# Patient Record
Sex: FEMALE | Race: WHITE | NOT HISPANIC OR LATINO | Employment: OTHER | URBAN - METROPOLITAN AREA
[De-identification: names, ages, dates, MRNs, and addresses within clinical notes are randomized per-mention and may not be internally consistent; named-entity substitution may affect disease eponyms.]

---

## 2023-08-25 ENCOUNTER — HOSPITAL ENCOUNTER (OUTPATIENT)
Dept: DATA CONVERSION | Facility: HOSPITAL | Age: 88
Discharge: HOME | End: 2023-08-25
Payer: MEDICARE

## 2023-08-25 DIAGNOSIS — F41.9 ANXIETY DISORDER, UNSPECIFIED: ICD-10-CM

## 2023-08-25 DIAGNOSIS — R00.0 TACHYCARDIA, UNSPECIFIED: ICD-10-CM

## 2023-08-25 DIAGNOSIS — Z79.84 LONG TERM (CURRENT) USE OF ORAL HYPOGLYCEMIC DRUGS: ICD-10-CM

## 2023-08-25 DIAGNOSIS — M79.602 PAIN IN LEFT ARM: ICD-10-CM

## 2023-08-25 DIAGNOSIS — H02.109 UNSPECIFIED ECTROPION OF UNSPECIFIED EYE, UNSPECIFIED EYELID: ICD-10-CM

## 2023-08-25 DIAGNOSIS — I10 ESSENTIAL (PRIMARY) HYPERTENSION: ICD-10-CM

## 2023-08-25 DIAGNOSIS — Z79.899 OTHER LONG TERM (CURRENT) DRUG THERAPY: ICD-10-CM

## 2023-08-25 DIAGNOSIS — Z79.01 LONG TERM (CURRENT) USE OF ANTICOAGULANTS: ICD-10-CM

## 2023-08-25 DIAGNOSIS — M25.412 EFFUSION, LEFT SHOULDER: ICD-10-CM

## 2023-08-25 DIAGNOSIS — R07.9 CHEST PAIN, UNSPECIFIED: ICD-10-CM

## 2023-08-25 DIAGNOSIS — I48.91 UNSPECIFIED ATRIAL FIBRILLATION (MULTI): ICD-10-CM

## 2023-08-25 DIAGNOSIS — R68.84 JAW PAIN: ICD-10-CM

## 2023-08-25 LAB
ANION GAP SERPL CALCULATED.3IONS-SCNC: 11 MMOL/L (ref 0–19)
ANTICOAGULANT: ABNORMAL
ANTICOAGULANT: ABNORMAL
APTT PPP: 38.9 SEC (ref 22–32.5)
BUN SERPL-MCNC: 31 MG/DL (ref 8–25)
BUN/CREAT SERPL: 34.4 RATIO (ref 8–21)
CALCIUM SERPL-MCNC: 9.2 MG/DL (ref 8.5–10.4)
CHLORIDE SERPL-SCNC: 102 MMOL/L (ref 97–107)
CO2 SERPL-SCNC: 28 MMOL/L (ref 24–31)
CREAT SERPL-MCNC: 0.9 MG/DL (ref 0.4–1.6)
DEPRECATED RDW RBC AUTO: 49.1 FL (ref 37–54)
DIFFERENTIAL METHOD BLD: MANUAL DIFF
ERYTHROCYTE [DISTWIDTH] IN BLOOD BY AUTOMATED COUNT: 14.3 % (ref 11.7–15)
GFR SERPL CREATININE-BSD FRML MDRD: 59 ML/MIN/1.73 M2
GLUCOSE SERPL-MCNC: 117 MG/DL (ref 65–99)
HCT VFR BLD AUTO: 36.4 % (ref 36–44)
HGB BLD-MCNC: 11.8 GM/DL (ref 12–15)
HS TROPONIN T DELTA: 1 (ref 0–4)
HS TROPONIN T DELTA: ABNORMAL (ref 0–4)
INR PPP: 2.3 (ref 0.86–1.16)
LYMPHOCYTES # BLD AUTO: 1.65 K/UL (ref 1.2–3.2)
LYMPHOCYTES NFR BLD MANUAL: 11 % (ref 20–40)
MCH RBC QN AUTO: 30.8 PG (ref 26–34)
MCHC RBC AUTO-ENTMCNC: 32.4 % (ref 31–37)
MCV RBC AUTO: 95 FL (ref 80–100)
MONOCYTES # BLD AUTO: 3.9 K/UL (ref 0–0.8)
MONOCYTES NFR BLD MANUAL: 26 % (ref 0–8)
NEUTROPHILS # BLD AUTO: 8.67 K/UL
NEUTROPHILS # BLD AUTO: 9.3 K/UL (ref 1.8–7.7)
NEUTS SEG NFR BLD: 62 % (ref 50–70)
NRBC BLD-RTO: 0 /100 WBC
PATH REV BLD -IMP: NORMAL
PLATELET # BLD AUTO: 269 K/UL (ref 150–450)
PLATELET BLD QL SMEAR: ABNORMAL
PMV BLD AUTO: 9.5 CU (ref 7–12.6)
POTASSIUM SERPL-SCNC: 4.2 MMOL/L (ref 3.4–5.1)
PROTHROMBIN TIME: 23.4 SEC (ref 9.3–12.7)
RBC # BLD AUTO: 3.83 M/UL (ref 4–4.9)
RBC MORPH BLD: ABNORMAL
SODIUM SERPL-SCNC: 140 MMOL/L (ref 133–145)
TROPONIN T SERPL-MCNC: 27 NG/L
TROPONIN T SERPL-MCNC: 28 NG/L
VARIANT LYMPHS # BLD MANUAL: 1 %
VARIANT LYMPHS NFR BLD MANUAL: 0.15 K/UL
WBC # BLD AUTO: 15 K/UL (ref 4.5–11)

## 2023-11-29 ENCOUNTER — APPOINTMENT (OUTPATIENT)
Dept: RADIOLOGY | Facility: HOSPITAL | Age: 88
End: 2023-11-29
Payer: MEDICARE

## 2023-11-29 ENCOUNTER — HOSPITAL ENCOUNTER (OUTPATIENT)
Facility: HOSPITAL | Age: 88
Setting detail: OBSERVATION
Discharge: HOME | End: 2023-11-30
Attending: STUDENT IN AN ORGANIZED HEALTH CARE EDUCATION/TRAINING PROGRAM | Admitting: INTERNAL MEDICINE
Payer: MEDICARE

## 2023-11-29 DIAGNOSIS — R53.1 WEAKNESS: ICD-10-CM

## 2023-11-29 DIAGNOSIS — L03.115 CELLULITIS OF RIGHT LOWER EXTREMITY: ICD-10-CM

## 2023-11-29 DIAGNOSIS — R11.2 NAUSEA AND VOMITING, UNSPECIFIED VOMITING TYPE: Primary | ICD-10-CM

## 2023-11-29 LAB
ANION GAP SERPL CALC-SCNC: 10 MMOL/L
APTT PPP: 42.3 SECONDS (ref 22–32.5)
BASOPHILS # BLD AUTO: 0.03 X10*3/UL (ref 0–0.1)
BASOPHILS NFR BLD AUTO: 0.3 %
BUN SERPL-MCNC: 36 MG/DL (ref 8–25)
CALCIUM SERPL-MCNC: 9.4 MG/DL (ref 8.5–10.4)
CHLORIDE SERPL-SCNC: 105 MMOL/L (ref 97–107)
CO2 SERPL-SCNC: 27 MMOL/L (ref 24–31)
CREAT SERPL-MCNC: 1 MG/DL (ref 0.4–1.6)
EOSINOPHIL # BLD AUTO: 0.01 X10*3/UL (ref 0–0.4)
EOSINOPHIL NFR BLD AUTO: 0.1 %
ERYTHROCYTE [DISTWIDTH] IN BLOOD BY AUTOMATED COUNT: 13.8 % (ref 11.5–14.5)
GFR SERPL CREATININE-BSD FRML MDRD: 52 ML/MIN/1.73M*2
GLUCOSE SERPL-MCNC: 133 MG/DL (ref 65–99)
HCT VFR BLD AUTO: 37.1 % (ref 36–46)
HGB BLD-MCNC: 11.6 G/DL (ref 12–16)
IMM GRANULOCYTES # BLD AUTO: 0.08 X10*3/UL (ref 0–0.5)
IMM GRANULOCYTES NFR BLD AUTO: 0.9 % (ref 0–0.9)
INR PPP: 3.8 (ref 0.9–1.2)
LIPASE SERPL-CCNC: 66 U/L (ref 16–63)
LYMPHOCYTES # BLD AUTO: 1.61 X10*3/UL (ref 0.8–3)
LYMPHOCYTES NFR BLD AUTO: 17.2 %
MCH RBC QN AUTO: 31 PG (ref 26–34)
MCHC RBC AUTO-ENTMCNC: 31.3 G/DL (ref 32–36)
MCV RBC AUTO: 99 FL (ref 80–100)
MONOCYTES # BLD AUTO: 0.89 X10*3/UL (ref 0.05–0.8)
MONOCYTES NFR BLD AUTO: 9.5 %
NEUTROPHILS # BLD AUTO: 6.73 X10*3/UL (ref 1.6–5.5)
NEUTROPHILS NFR BLD AUTO: 72 %
NRBC BLD-RTO: 0 /100 WBCS (ref 0–0)
PLATELET # BLD AUTO: 287 X10*3/UL (ref 150–450)
POTASSIUM SERPL-SCNC: 4.2 MMOL/L (ref 3.4–5.1)
PROTHROMBIN TIME: 37 SECONDS (ref 9.3–12.7)
RBC # BLD AUTO: 3.74 X10*6/UL (ref 4–5.2)
SODIUM SERPL-SCNC: 142 MMOL/L (ref 133–145)
WBC # BLD AUTO: 9.4 X10*3/UL (ref 4.4–11.3)

## 2023-11-29 PROCEDURE — 2500000004 HC RX 250 GENERAL PHARMACY W/ HCPCS (ALT 636 FOR OP/ED): Performed by: INTERNAL MEDICINE

## 2023-11-29 PROCEDURE — 96376 TX/PRO/DX INJ SAME DRUG ADON: CPT | Performed by: NURSE ANESTHETIST, CERTIFIED REGISTERED

## 2023-11-29 PROCEDURE — 85025 COMPLETE CBC W/AUTO DIFF WBC: CPT | Performed by: STUDENT IN AN ORGANIZED HEALTH CARE EDUCATION/TRAINING PROGRAM

## 2023-11-29 PROCEDURE — 74177 CT ABD & PELVIS W/CONTRAST: CPT

## 2023-11-29 PROCEDURE — 2550000001 HC RX 255 CONTRASTS: Performed by: STUDENT IN AN ORGANIZED HEALTH CARE EDUCATION/TRAINING PROGRAM

## 2023-11-29 PROCEDURE — 2500000001 HC RX 250 WO HCPCS SELF ADMINISTERED DRUGS (ALT 637 FOR MEDICARE OP): Performed by: INTERNAL MEDICINE

## 2023-11-29 PROCEDURE — 36415 COLL VENOUS BLD VENIPUNCTURE: CPT | Performed by: STUDENT IN AN ORGANIZED HEALTH CARE EDUCATION/TRAINING PROGRAM

## 2023-11-29 PROCEDURE — 85610 PROTHROMBIN TIME: CPT | Performed by: STUDENT IN AN ORGANIZED HEALTH CARE EDUCATION/TRAINING PROGRAM

## 2023-11-29 PROCEDURE — 80048 BASIC METABOLIC PNL TOTAL CA: CPT | Performed by: STUDENT IN AN ORGANIZED HEALTH CARE EDUCATION/TRAINING PROGRAM

## 2023-11-29 PROCEDURE — C9113 INJ PANTOPRAZOLE SODIUM, VIA: HCPCS | Performed by: INTERNAL MEDICINE

## 2023-11-29 PROCEDURE — G0378 HOSPITAL OBSERVATION PER HR: HCPCS

## 2023-11-29 PROCEDURE — 2500000001 HC RX 250 WO HCPCS SELF ADMINISTERED DRUGS (ALT 637 FOR MEDICARE OP): Performed by: STUDENT IN AN ORGANIZED HEALTH CARE EDUCATION/TRAINING PROGRAM

## 2023-11-29 PROCEDURE — 2500000004 HC RX 250 GENERAL PHARMACY W/ HCPCS (ALT 636 FOR OP/ED): Performed by: STUDENT IN AN ORGANIZED HEALTH CARE EDUCATION/TRAINING PROGRAM

## 2023-11-29 PROCEDURE — 83690 ASSAY OF LIPASE: CPT | Performed by: STUDENT IN AN ORGANIZED HEALTH CARE EDUCATION/TRAINING PROGRAM

## 2023-11-29 PROCEDURE — 71045 X-RAY EXAM CHEST 1 VIEW: CPT

## 2023-11-29 PROCEDURE — 96365 THER/PROPH/DIAG IV INF INIT: CPT | Performed by: NURSE ANESTHETIST, CERTIFIED REGISTERED

## 2023-11-29 PROCEDURE — C9113 INJ PANTOPRAZOLE SODIUM, VIA: HCPCS | Performed by: STUDENT IN AN ORGANIZED HEALTH CARE EDUCATION/TRAINING PROGRAM

## 2023-11-29 PROCEDURE — 85730 THROMBOPLASTIN TIME PARTIAL: CPT | Performed by: STUDENT IN AN ORGANIZED HEALTH CARE EDUCATION/TRAINING PROGRAM

## 2023-11-29 PROCEDURE — 99285 EMERGENCY DEPT VISIT HI MDM: CPT | Performed by: STUDENT IN AN ORGANIZED HEALTH CARE EDUCATION/TRAINING PROGRAM

## 2023-11-29 PROCEDURE — 96375 TX/PRO/DX INJ NEW DRUG ADDON: CPT | Performed by: NURSE ANESTHETIST, CERTIFIED REGISTERED

## 2023-11-29 RX ORDER — LIDOCAINE HYDROCHLORIDE 20 MG/ML
15 SOLUTION OROPHARYNGEAL ONCE
Status: COMPLETED | OUTPATIENT
Start: 2023-11-29 | End: 2023-11-29

## 2023-11-29 RX ORDER — ACETAMINOPHEN 325 MG/1
650 TABLET ORAL EVERY 4 HOURS PRN
Status: DISCONTINUED | OUTPATIENT
Start: 2023-11-29 | End: 2023-11-30 | Stop reason: HOSPADM

## 2023-11-29 RX ORDER — ACETAMINOPHEN 650 MG/1
650 SUPPOSITORY RECTAL EVERY 4 HOURS PRN
Status: DISCONTINUED | OUTPATIENT
Start: 2023-11-29 | End: 2023-11-30 | Stop reason: HOSPADM

## 2023-11-29 RX ORDER — POLYETHYLENE GLYCOL 3350 17 G/17G
17 POWDER, FOR SOLUTION ORAL DAILY PRN
Status: DISCONTINUED | OUTPATIENT
Start: 2023-11-29 | End: 2023-11-30 | Stop reason: HOSPADM

## 2023-11-29 RX ORDER — SODIUM CHLORIDE 9 MG/ML
125 INJECTION, SOLUTION INTRAVENOUS CONTINUOUS
Status: DISCONTINUED | OUTPATIENT
Start: 2023-11-29 | End: 2023-11-30 | Stop reason: HOSPADM

## 2023-11-29 RX ORDER — METOCLOPRAMIDE HYDROCHLORIDE 5 MG/ML
10 INJECTION INTRAMUSCULAR; INTRAVENOUS ONCE
Status: COMPLETED | OUTPATIENT
Start: 2023-11-29 | End: 2023-11-29

## 2023-11-29 RX ORDER — GABAPENTIN 100 MG/1
100 CAPSULE ORAL 3 TIMES DAILY
Status: ON HOLD | COMMUNITY
Start: 2023-07-25 | End: 2023-12-01 | Stop reason: SDUPTHER

## 2023-11-29 RX ORDER — ALUMINUM HYDROXIDE, MAGNESIUM HYDROXIDE, AND SIMETHICONE 1200; 120; 1200 MG/30ML; MG/30ML; MG/30ML
30 SUSPENSION ORAL ONCE
Status: COMPLETED | OUTPATIENT
Start: 2023-11-29 | End: 2023-11-29

## 2023-11-29 RX ORDER — MONTELUKAST SODIUM 4 MG/1
2 TABLET, CHEWABLE ORAL 2 TIMES DAILY
Status: DISCONTINUED | OUTPATIENT
Start: 2023-11-29 | End: 2023-11-30 | Stop reason: HOSPADM

## 2023-11-29 RX ORDER — VANCOMYCIN 1 G/200ML
1000 INJECTION, SOLUTION INTRAVENOUS EVERY 24 HOURS
Status: DISCONTINUED | OUTPATIENT
Start: 2023-11-29 | End: 2023-11-30

## 2023-11-29 RX ORDER — CHLORTHALIDONE 25 MG/1
25 TABLET ORAL
COMMUNITY
Start: 2023-04-11 | End: 2023-12-01

## 2023-11-29 RX ORDER — ONDANSETRON HYDROCHLORIDE 2 MG/ML
4 INJECTION, SOLUTION INTRAVENOUS EVERY 8 HOURS PRN
Status: DISCONTINUED | OUTPATIENT
Start: 2023-11-29 | End: 2023-11-30 | Stop reason: HOSPADM

## 2023-11-29 RX ORDER — WARFARIN 10 MG/1
10 TABLET ORAL SEE ADMIN INSTRUCTIONS
COMMUNITY
End: 2023-11-30 | Stop reason: HOSPADM

## 2023-11-29 RX ORDER — WARFARIN SODIUM 5 MG/1
5 TABLET ORAL SEE ADMIN INSTRUCTIONS
COMMUNITY
Start: 2020-02-04 | End: 2023-11-30 | Stop reason: HOSPADM

## 2023-11-29 RX ORDER — METFORMIN HYDROCHLORIDE 500 MG/1
500 TABLET ORAL
COMMUNITY
Start: 2023-08-07 | End: 2023-12-01

## 2023-11-29 RX ORDER — AMLODIPINE BESYLATE 2.5 MG/1
2.5 TABLET ORAL DAILY
Status: DISCONTINUED | OUTPATIENT
Start: 2023-11-30 | End: 2023-11-30 | Stop reason: HOSPADM

## 2023-11-29 RX ORDER — GABAPENTIN 100 MG/1
100 CAPSULE ORAL 3 TIMES DAILY
Status: DISCONTINUED | OUTPATIENT
Start: 2023-11-29 | End: 2023-11-30 | Stop reason: HOSPADM

## 2023-11-29 RX ORDER — CEFTRIAXONE 1 G/50ML
1 INJECTION, SOLUTION INTRAVENOUS EVERY 24 HOURS
Status: DISCONTINUED | OUTPATIENT
Start: 2023-11-29 | End: 2023-11-30

## 2023-11-29 RX ORDER — LATANOPROST 50 UG/ML
1 SOLUTION/ DROPS OPHTHALMIC NIGHTLY
Status: DISCONTINUED | OUTPATIENT
Start: 2023-11-29 | End: 2023-11-30 | Stop reason: HOSPADM

## 2023-11-29 RX ORDER — PANTOPRAZOLE SODIUM 40 MG/10ML
40 INJECTION, POWDER, LYOPHILIZED, FOR SOLUTION INTRAVENOUS ONCE
Status: COMPLETED | OUTPATIENT
Start: 2023-11-29 | End: 2023-11-29

## 2023-11-29 RX ORDER — HYDRALAZINE HYDROCHLORIDE 20 MG/ML
10 INJECTION INTRAMUSCULAR; INTRAVENOUS EVERY 4 HOURS PRN
Status: DISCONTINUED | OUTPATIENT
Start: 2023-11-29 | End: 2023-11-30 | Stop reason: HOSPADM

## 2023-11-29 RX ORDER — METOPROLOL SUCCINATE 25 MG/1
100 TABLET, EXTENDED RELEASE ORAL DAILY
COMMUNITY
Start: 2020-04-27 | End: 2023-12-19 | Stop reason: HOSPADM

## 2023-11-29 RX ORDER — TALC
3 POWDER (GRAM) TOPICAL DAILY
Status: DISCONTINUED | OUTPATIENT
Start: 2023-11-29 | End: 2023-11-30 | Stop reason: HOSPADM

## 2023-11-29 RX ORDER — POTASSIUM CHLORIDE 750 MG/1
10 TABLET, EXTENDED RELEASE ORAL DAILY
COMMUNITY
End: 2023-12-01

## 2023-11-29 RX ORDER — AMLODIPINE BESYLATE 2.5 MG/1
2.5 TABLET ORAL DAILY
COMMUNITY
End: 2023-12-19 | Stop reason: HOSPADM

## 2023-11-29 RX ORDER — ACETAMINOPHEN 160 MG/5ML
650 SOLUTION ORAL EVERY 4 HOURS PRN
Status: DISCONTINUED | OUTPATIENT
Start: 2023-11-29 | End: 2023-11-30 | Stop reason: HOSPADM

## 2023-11-29 RX ORDER — METOPROLOL SUCCINATE 25 MG/1
25 TABLET, EXTENDED RELEASE ORAL DAILY
Status: DISCONTINUED | OUTPATIENT
Start: 2023-11-30 | End: 2023-11-30 | Stop reason: HOSPADM

## 2023-11-29 RX ORDER — MONTELUKAST SODIUM 4 MG/1
2 TABLET, CHEWABLE ORAL 2 TIMES DAILY
COMMUNITY

## 2023-11-29 RX ORDER — PANTOPRAZOLE SODIUM 40 MG/10ML
40 INJECTION, POWDER, LYOPHILIZED, FOR SOLUTION INTRAVENOUS 2 TIMES DAILY
Status: DISCONTINUED | OUTPATIENT
Start: 2023-11-29 | End: 2023-11-30 | Stop reason: HOSPADM

## 2023-11-29 RX ORDER — PREDNISONE 5 MG/1
5 TABLET ORAL DAILY
Status: DISCONTINUED | OUTPATIENT
Start: 2023-11-30 | End: 2023-11-30 | Stop reason: HOSPADM

## 2023-11-29 RX ORDER — LEVOTHYROXINE SODIUM 100 UG/1
100 TABLET ORAL
COMMUNITY
Start: 2023-08-07

## 2023-11-29 RX ORDER — ONDANSETRON 4 MG/1
4 TABLET, ORALLY DISINTEGRATING ORAL EVERY 8 HOURS PRN
Status: DISCONTINUED | OUTPATIENT
Start: 2023-11-29 | End: 2023-11-30 | Stop reason: HOSPADM

## 2023-11-29 RX ORDER — ACETAMINOPHEN 325 MG/1
1000 TABLET ORAL 2 TIMES DAILY PRN
COMMUNITY

## 2023-11-29 RX ORDER — CHLORTHALIDONE 25 MG/1
25 TABLET ORAL
Status: DISCONTINUED | OUTPATIENT
Start: 2023-11-29 | End: 2023-11-30 | Stop reason: HOSPADM

## 2023-11-29 RX ORDER — LEVOTHYROXINE SODIUM 100 UG/1
100 TABLET ORAL
Status: DISCONTINUED | OUTPATIENT
Start: 2023-11-30 | End: 2023-11-30 | Stop reason: HOSPADM

## 2023-11-29 RX ORDER — PREDNISONE 5 MG/1
5 TABLET ORAL DAILY
COMMUNITY
End: 2023-12-19 | Stop reason: HOSPADM

## 2023-11-29 RX ORDER — POTASSIUM CHLORIDE 750 MG/1
10 TABLET, FILM COATED, EXTENDED RELEASE ORAL DAILY
Status: DISCONTINUED | OUTPATIENT
Start: 2023-11-30 | End: 2023-11-30 | Stop reason: HOSPADM

## 2023-11-29 RX ADMIN — ALUMINUM HYDROXIDE, MAGNESIUM HYDROXIDE, AND SIMETHICONE 30 ML: 200; 200; 20 SUSPENSION ORAL at 15:52

## 2023-11-29 RX ADMIN — METOCLOPRAMIDE 10 MG: 5 INJECTION, SOLUTION INTRAMUSCULAR; INTRAVENOUS at 15:50

## 2023-11-29 RX ADMIN — IOHEXOL 75 ML: 350 INJECTION, SOLUTION INTRAVENOUS at 17:05

## 2023-11-29 RX ADMIN — PANTOPRAZOLE SODIUM 40 MG: 40 INJECTION, POWDER, FOR SOLUTION INTRAVENOUS at 23:07

## 2023-11-29 RX ADMIN — SODIUM CHLORIDE 125 ML/HR: 900 INJECTION, SOLUTION INTRAVENOUS at 18:47

## 2023-11-29 RX ADMIN — LATANOPROST 1 DROP: 50 SOLUTION OPHTHALMIC at 22:48

## 2023-11-29 RX ADMIN — CEFTRIAXONE SODIUM 1 G: 1 INJECTION, SOLUTION INTRAVENOUS at 23:07

## 2023-11-29 RX ADMIN — PANTOPRAZOLE SODIUM 40 MG: 40 INJECTION, POWDER, FOR SOLUTION INTRAVENOUS at 15:50

## 2023-11-29 RX ADMIN — LIDOCAINE HYDROCHLORIDE 15 ML: 20 SOLUTION ORAL at 15:52

## 2023-11-29 SDOH — SOCIAL STABILITY: SOCIAL INSECURITY: DO YOU FEEL ANYONE HAS EXPLOITED OR TAKEN ADVANTAGE OF YOU FINANCIALLY OR OF YOUR PERSONAL PROPERTY?: NO

## 2023-11-29 SDOH — SOCIAL STABILITY: SOCIAL INSECURITY: DOES ANYONE TRY TO KEEP YOU FROM HAVING/CONTACTING OTHER FRIENDS OR DOING THINGS OUTSIDE YOUR HOME?: NO

## 2023-11-29 SDOH — HEALTH STABILITY: MENTAL HEALTH: EXPERIENCED ANY OF THE FOLLOWING LIFE EVENTS: OTHER (COMMENT)

## 2023-11-29 SDOH — SOCIAL STABILITY: SOCIAL INSECURITY: ARE THERE ANY APPARENT SIGNS OF INJURIES/BEHAVIORS THAT COULD BE RELATED TO ABUSE/NEGLECT?: NO

## 2023-11-29 SDOH — SOCIAL STABILITY: SOCIAL INSECURITY: WERE YOU ABLE TO COMPLETE ALL THE BEHAVIORAL HEALTH SCREENINGS?: YES

## 2023-11-29 SDOH — SOCIAL STABILITY: SOCIAL INSECURITY: DO YOU FEEL UNSAFE GOING BACK TO THE PLACE WHERE YOU ARE LIVING?: NO

## 2023-11-29 SDOH — SOCIAL STABILITY: SOCIAL INSECURITY: ARE YOU OR HAVE YOU BEEN THREATENED OR ABUSED PHYSICALLY, EMOTIONALLY, OR SEXUALLY BY ANYONE?: NO

## 2023-11-29 SDOH — SOCIAL STABILITY: SOCIAL INSECURITY: HAS ANYONE EVER THREATENED TO HURT YOUR FAMILY OR YOUR PETS?: NO

## 2023-11-29 SDOH — SOCIAL STABILITY: SOCIAL INSECURITY: HAVE YOU HAD THOUGHTS OF HARMING ANYONE ELSE?: NO

## 2023-11-29 ASSESSMENT — PATIENT HEALTH QUESTIONNAIRE - PHQ9
1. LITTLE INTEREST OR PLEASURE IN DOING THINGS: NOT AT ALL
2. FEELING DOWN, DEPRESSED OR HOPELESS: NOT AT ALL
SUM OF ALL RESPONSES TO PHQ9 QUESTIONS 1 & 2: 0

## 2023-11-29 ASSESSMENT — PAIN DESCRIPTION - LOCATION
LOCATION: BACK
LOCATION: THROAT

## 2023-11-29 ASSESSMENT — LIFESTYLE VARIABLES
HOW MANY STANDARD DRINKS CONTAINING ALCOHOL DO YOU HAVE ON A TYPICAL DAY: PATIENT DOES NOT DRINK
HOW OFTEN DO YOU HAVE A DRINK CONTAINING ALCOHOL: NEVER
PRESCIPTION_ABUSE_PAST_12_MONTHS: NO
SKIP TO QUESTIONS 9-10: 1
HAVE YOU EVER FELT YOU SHOULD CUT DOWN ON YOUR DRINKING: NO
AUDIT-C TOTAL SCORE: 0
SUBSTANCE_ABUSE_PAST_12_MONTHS: NO
REASON UNABLE TO ASSESS: NO
HOW OFTEN DO YOU HAVE 6 OR MORE DRINKS ON ONE OCCASION: NEVER
EVER FELT BAD OR GUILTY ABOUT YOUR DRINKING: NO
AUDIT-C TOTAL SCORE: 0
HAVE PEOPLE ANNOYED YOU BY CRITICIZING YOUR DRINKING: NO
EVER HAD A DRINK FIRST THING IN THE MORNING TO STEADY YOUR NERVES TO GET RID OF A HANGOVER: NO

## 2023-11-29 ASSESSMENT — PAIN SCALES - GENERAL
PAINLEVEL_OUTOF10: 2
PAINLEVEL_OUTOF10: 4

## 2023-11-29 ASSESSMENT — COLUMBIA-SUICIDE SEVERITY RATING SCALE - C-SSRS
1. IN THE PAST MONTH, HAVE YOU WISHED YOU WERE DEAD OR WISHED YOU COULD GO TO SLEEP AND NOT WAKE UP?: NO
2. HAVE YOU ACTUALLY HAD ANY THOUGHTS OF KILLING YOURSELF?: NO
6. HAVE YOU EVER DONE ANYTHING, STARTED TO DO ANYTHING, OR PREPARED TO DO ANYTHING TO END YOUR LIFE?: NO

## 2023-11-29 ASSESSMENT — PAIN - FUNCTIONAL ASSESSMENT: PAIN_FUNCTIONAL_ASSESSMENT: 0-10

## 2023-11-29 ASSESSMENT — PAIN DESCRIPTION - PAIN TYPE: TYPE: ACUTE PAIN

## 2023-11-29 NOTE — ED PROVIDER NOTES
HPI   Chief Complaint   Patient presents with   • Aspiration     Pt attempted to swallow a large pill and states it got stuck in her throat one hour ago. Pt is unable to intake any fluids currently but believes she finally got the pill down.       HPI     Pt is a 89 yo F w/ Hx HLD, HTN, afib on warfarin,   Presenting for evalation of pill impaction   Pt states attempted to swallow her cholesterol medication when it got stuck in her throat   Has felt it migrate down but has been unable to tolerate anything by mouth since                  [place] Coma Scale Score: 15                  Patient History   No past medical history on file.  No past surgical history on file.  No family history on file.  Social History     Tobacco Use   • Smoking status: Not on file   • Smokeless tobacco: Not on file   Substance Use Topics   • Alcohol use: Not on file   • Drug use: Not on file       Physical Exam   ED Triage Vitals [11/29/23 1338]   Temp Heart Rate Resp BP   36.4 °C (97.5 °F) 92 18 (!) 192/95      SpO2 Temp Source Heart Rate Source Patient Position   96 % Oral Monitor Lying      BP Location FiO2 (%)     Left arm --       Physical Exam  Vitals and nursing note reviewed.   Constitutional:       General: She is not in acute distress.     Appearance: She is well-developed.      Comments: Difficulty tolerating her secretions, though no resp distress    HENT:      Head: Normocephalic and atraumatic.      Nose: Nose normal.   Eyes:      Conjunctiva/sclera: Conjunctivae normal.   Cardiovascular:      Rate and Rhythm: Normal rate and regular rhythm.   Pulmonary:      Effort: Pulmonary effort is normal. No respiratory distress.      Breath sounds: Normal breath sounds. No stridor.      Comments: Speaking in clear sentences, no labored resp  Abdominal:      General: Abdomen is flat. There is no distension.   Musculoskeletal:         General: No swelling or signs of injury.   Skin:     General: Skin is warm and dry.   Neurological:       General: No focal deficit present.      Mental Status: She is alert and oriented to person, place, and time. Mental status is at baseline.   Psychiatric:         Mood and Affect: Mood normal.         ED Course & MDM   ED Course as of 11/29/23 1849 Wed Nov 29, 2023   1426 ECG 12 lead  NSR, rate 87, RBBB, no stemi  []   1541 Pt seen by GI, Dr. Dunn was able to tolerate oral liquids, rec GI cocktail, protonix for 10 days and outpt FU  []   1846 Pt unable to tolerate PO after initially tolerating small amt liquid. Attempted GI cocktail along w/ protonix and anti emetic. Pt does state she feels improved however still unable to tolerate anything PO. She is able to tolerate her secretions now. Did undergo CT for eval infx / acute intra abd process. Has no pneumobilia likely related to prior monica/ERCP. Abd is soft on re eval, no focal ttp. Will admit for period of observation for continued hydration and supportive care. Pt endorsed to hospitalist for admit. Dr. Voss  []      ED Course User Index  [] Carlyle Aguirre MD         Diagnoses as of 11/29/23 1849   Nausea and vomiting, unspecified vomiting type       Medical Decision Making  Pt presents w/ suspected pill esophagitis / impaction   VS notable for HTN, pt unable to tolerate secretions   Attempted carbonated beverage at bedside, unable to tolerate   Will obtain basic labs/ XR   Contact GI for possible endoscopy     Procedure  Procedures     Carlyle Aguirre MD  11/29/23 1850

## 2023-11-30 ENCOUNTER — PHARMACY VISIT (OUTPATIENT)
Dept: PHARMACY | Facility: CLINIC | Age: 88
End: 2023-11-30
Payer: COMMERCIAL

## 2023-11-30 VITALS
HEIGHT: 62 IN | TEMPERATURE: 98.1 F | BODY MASS INDEX: 29.21 KG/M2 | OXYGEN SATURATION: 96 % | SYSTOLIC BLOOD PRESSURE: 145 MMHG | HEART RATE: 82 BPM | WEIGHT: 158.73 LBS | DIASTOLIC BLOOD PRESSURE: 67 MMHG | RESPIRATION RATE: 18 BRPM

## 2023-11-30 PROBLEM — R11.2 NAUSEA & VOMITING: Status: RESOLVED | Noted: 2023-11-29 | Resolved: 2023-11-30

## 2023-11-30 LAB
ANION GAP SERPL CALC-SCNC: 12 MMOL/L
BUN SERPL-MCNC: 26 MG/DL (ref 8–25)
CALCIUM SERPL-MCNC: 8.3 MG/DL (ref 8.5–10.4)
CHLORIDE SERPL-SCNC: 106 MMOL/L (ref 97–107)
CO2 SERPL-SCNC: 24 MMOL/L (ref 24–31)
CREAT SERPL-MCNC: 0.8 MG/DL (ref 0.4–1.6)
ERYTHROCYTE [DISTWIDTH] IN BLOOD BY AUTOMATED COUNT: 13.6 % (ref 11.5–14.5)
EST. AVERAGE GLUCOSE BLD GHB EST-MCNC: 143 MG/DL
GFR SERPL CREATININE-BSD FRML MDRD: 68 ML/MIN/1.73M*2
GLUCOSE SERPL-MCNC: 144 MG/DL (ref 65–99)
HBA1C MFR BLD: 6.6 %
HCT VFR BLD AUTO: 31.5 % (ref 36–46)
HGB BLD-MCNC: 10.7 G/DL (ref 12–16)
INR PPP: 3.9 (ref 0.9–1.2)
MCH RBC QN AUTO: 31.4 PG (ref 26–34)
MCHC RBC AUTO-ENTMCNC: 34 G/DL (ref 32–36)
MCV RBC AUTO: 92 FL (ref 80–100)
NRBC BLD-RTO: 0 /100 WBCS (ref 0–0)
PLATELET # BLD AUTO: 261 X10*3/UL (ref 150–450)
POTASSIUM SERPL-SCNC: 4 MMOL/L (ref 3.4–5.1)
PROTHROMBIN TIME: 38.2 SECONDS (ref 9.3–12.7)
RBC # BLD AUTO: 3.41 X10*6/UL (ref 4–5.2)
SODIUM SERPL-SCNC: 142 MMOL/L (ref 133–145)
VANCOMYCIN SERPL-MCNC: 13.5 UG/ML (ref 10–20)
WBC # BLD AUTO: 19.2 X10*3/UL (ref 4.4–11.3)

## 2023-11-30 PROCEDURE — 36415 COLL VENOUS BLD VENIPUNCTURE: CPT | Performed by: INTERNAL MEDICINE

## 2023-11-30 PROCEDURE — 87181 SC STD AGAR DILUTION PER AGT: CPT | Mod: WESLAB | Performed by: NURSE PRACTITIONER

## 2023-11-30 PROCEDURE — 85610 PROTHROMBIN TIME: CPT | Performed by: INTERNAL MEDICINE

## 2023-11-30 PROCEDURE — 97161 PT EVAL LOW COMPLEX 20 MIN: CPT | Mod: GP

## 2023-11-30 PROCEDURE — RXMED WILLOW AMBULATORY MEDICATION CHARGE

## 2023-11-30 PROCEDURE — 80048 BASIC METABOLIC PNL TOTAL CA: CPT | Performed by: INTERNAL MEDICINE

## 2023-11-30 PROCEDURE — 87186 SC STD MICRODIL/AGAR DIL: CPT | Mod: 59,WESLAB | Performed by: NURSE PRACTITIONER

## 2023-11-30 PROCEDURE — 97166 OT EVAL MOD COMPLEX 45 MIN: CPT | Mod: GO

## 2023-11-30 PROCEDURE — 80202 ASSAY OF VANCOMYCIN: CPT | Performed by: INTERNAL MEDICINE

## 2023-11-30 PROCEDURE — C9113 INJ PANTOPRAZOLE SODIUM, VIA: HCPCS | Performed by: INTERNAL MEDICINE

## 2023-11-30 PROCEDURE — 96367 TX/PROPH/DG ADDL SEQ IV INF: CPT | Performed by: NURSE ANESTHETIST, CERTIFIED REGISTERED

## 2023-11-30 PROCEDURE — 83036 HEMOGLOBIN GLYCOSYLATED A1C: CPT | Performed by: INTERNAL MEDICINE

## 2023-11-30 PROCEDURE — 2500000004 HC RX 250 GENERAL PHARMACY W/ HCPCS (ALT 636 FOR OP/ED): Mod: MUE | Performed by: INTERNAL MEDICINE

## 2023-11-30 PROCEDURE — G0378 HOSPITAL OBSERVATION PER HR: HCPCS

## 2023-11-30 PROCEDURE — 97530 THERAPEUTIC ACTIVITIES: CPT | Mod: GO

## 2023-11-30 PROCEDURE — 2500000004 HC RX 250 GENERAL PHARMACY W/ HCPCS (ALT 636 FOR OP/ED): Performed by: INTERNAL MEDICINE

## 2023-11-30 PROCEDURE — 2500000001 HC RX 250 WO HCPCS SELF ADMINISTERED DRUGS (ALT 637 FOR MEDICARE OP): Performed by: NURSE PRACTITIONER

## 2023-11-30 PROCEDURE — 85027 COMPLETE CBC AUTOMATED: CPT | Performed by: INTERNAL MEDICINE

## 2023-11-30 PROCEDURE — 2500000001 HC RX 250 WO HCPCS SELF ADMINISTERED DRUGS (ALT 637 FOR MEDICARE OP): Performed by: INTERNAL MEDICINE

## 2023-11-30 PROCEDURE — 2500000004 HC RX 250 GENERAL PHARMACY W/ HCPCS (ALT 636 FOR OP/ED): Performed by: STUDENT IN AN ORGANIZED HEALTH CARE EDUCATION/TRAINING PROGRAM

## 2023-11-30 PROCEDURE — 96376 TX/PRO/DX INJ SAME DRUG ADON: CPT | Performed by: NURSE ANESTHETIST, CERTIFIED REGISTERED

## 2023-11-30 RX ORDER — AMOXICILLIN AND CLAVULANATE POTASSIUM 500; 125 MG/1; MG/1
500 TABLET, FILM COATED ORAL EVERY 12 HOURS SCHEDULED
Status: DISCONTINUED | OUTPATIENT
Start: 2023-11-30 | End: 2023-11-30 | Stop reason: HOSPADM

## 2023-11-30 RX ORDER — DOXYCYCLINE 100 MG/1
100 CAPSULE ORAL EVERY 12 HOURS SCHEDULED
Qty: 28 CAPSULE | Refills: 0 | Status: SHIPPED | OUTPATIENT
Start: 2023-11-30 | End: 2023-11-30 | Stop reason: SDUPTHER

## 2023-11-30 RX ORDER — AMOXICILLIN AND CLAVULANATE POTASSIUM 500; 125 MG/1; MG/1
500 TABLET, FILM COATED ORAL EVERY 12 HOURS SCHEDULED
Qty: 28 TABLET | Refills: 0 | Status: SHIPPED | OUTPATIENT
Start: 2023-12-01 | End: 2023-11-30 | Stop reason: SDUPTHER

## 2023-11-30 RX ORDER — DOXYCYCLINE 100 MG/1
100 CAPSULE ORAL EVERY 12 HOURS SCHEDULED
Qty: 28 CAPSULE | Refills: 0 | Status: SHIPPED | OUTPATIENT
Start: 2023-11-30 | End: 2023-12-19 | Stop reason: HOSPADM

## 2023-11-30 RX ORDER — AMOXICILLIN AND CLAVULANATE POTASSIUM 500; 125 MG/1; MG/1
500 TABLET, FILM COATED ORAL EVERY 12 HOURS SCHEDULED
Qty: 28 TABLET | Refills: 0
Start: 2023-12-01 | End: 2023-11-30 | Stop reason: SDUPTHER

## 2023-11-30 RX ORDER — DOXYCYCLINE 100 MG/1
100 CAPSULE ORAL EVERY 12 HOURS SCHEDULED
Status: DISCONTINUED | OUTPATIENT
Start: 2023-11-30 | End: 2023-11-30 | Stop reason: HOSPADM

## 2023-11-30 RX ORDER — AMOXICILLIN AND CLAVULANATE POTASSIUM 500; 125 MG/1; MG/1
500 TABLET, FILM COATED ORAL EVERY 12 HOURS SCHEDULED
Qty: 28 TABLET | Refills: 0 | Status: SHIPPED | OUTPATIENT
Start: 2023-12-01 | End: 2023-12-19 | Stop reason: HOSPADM

## 2023-11-30 RX ADMIN — LEVOTHYROXINE SODIUM 100 MCG: 0.1 TABLET ORAL at 06:47

## 2023-11-30 RX ADMIN — VANCOMYCIN 1000 MG: 1 INJECTION, SOLUTION INTRAVENOUS at 00:18

## 2023-11-30 RX ADMIN — PREDNISONE 5 MG: 5 TABLET ORAL at 10:09

## 2023-11-30 RX ADMIN — GABAPENTIN 100 MG: 100 CAPSULE ORAL at 10:08

## 2023-11-30 RX ADMIN — AMOXICILLIN AND CLAVULANATE POTASSIUM 500 MG: 500; 125 TABLET, FILM COATED ORAL at 16:08

## 2023-11-30 RX ADMIN — GABAPENTIN 100 MG: 100 CAPSULE ORAL at 16:07

## 2023-11-30 RX ADMIN — SODIUM CHLORIDE 125 ML/HR: 900 INJECTION, SOLUTION INTRAVENOUS at 06:41

## 2023-11-30 RX ADMIN — POTASSIUM CHLORIDE 10 MEQ: 750 TABLET, EXTENDED RELEASE ORAL at 10:09

## 2023-11-30 RX ADMIN — AMLODIPINE BESYLATE 2.5 MG: 2.5 TABLET ORAL at 10:09

## 2023-11-30 RX ADMIN — COLESTIPOL HYDROCHLORIDE 2 G: 1 TABLET ORAL at 10:08

## 2023-11-30 RX ADMIN — PANTOPRAZOLE SODIUM 40 MG: 40 INJECTION, POWDER, FOR SOLUTION INTRAVENOUS at 10:08

## 2023-11-30 RX ADMIN — METOPROLOL SUCCINATE 25 MG: 25 TABLET, EXTENDED RELEASE ORAL at 10:09

## 2023-11-30 ASSESSMENT — ACTIVITIES OF DAILY LIVING (ADL)
PATIENT'S MEMORY ADEQUATE TO SAFELY COMPLETE DAILY ACTIVITIES?: YES
ADL_ASSISTANCE: INDEPENDENT
DRESSING YOURSELF: INDEPENDENT
BATHING_ASSISTANCE: MODERATE
LACK_OF_TRANSPORTATION: NO
HEARING - LEFT EAR: DIFFICULTY WITH NOISE
ASSISTIVE_DEVICE: EYEGLASSES;WALKER
WALKS IN HOME: INDEPENDENT
JUDGMENT_ADEQUATE_SAFELY_COMPLETE_DAILY_ACTIVITIES: YES
ADEQUATE_TO_COMPLETE_ADL: YES
LACK_OF_TRANSPORTATION: NO
FEEDING YOURSELF: INDEPENDENT
GROOMING: INDEPENDENT
HEARING - RIGHT EAR: DIFFICULTY WITH NOISE
TOILETING: NEEDS ASSISTANCE
BATHING: NEEDS ASSISTANCE

## 2023-11-30 ASSESSMENT — ENCOUNTER SYMPTOMS
CONSTITUTIONAL NEGATIVE: 1
CARDIOVASCULAR NEGATIVE: 1
EYES NEGATIVE: 1
NEUROLOGICAL NEGATIVE: 1
PSYCHIATRIC NEGATIVE: 1
RESPIRATORY NEGATIVE: 1
MUSCULOSKELETAL NEGATIVE: 1
ENDOCRINE NEGATIVE: 1
GASTROINTESTINAL NEGATIVE: 1

## 2023-11-30 ASSESSMENT — COGNITIVE AND FUNCTIONAL STATUS - GENERAL
HELP NEEDED FOR BATHING: A LOT
PERSONAL GROOMING: A LITTLE
DRESSING REGULAR UPPER BODY CLOTHING: A LITTLE
MOVING TO AND FROM BED TO CHAIR: A LITTLE
DAILY ACTIVITIY SCORE: 17
TOILETING: A LOT
CLIMB 3 TO 5 STEPS WITH RAILING: A LOT
TURNING FROM BACK TO SIDE WHILE IN FLAT BAD: A LITTLE
MOVING TO AND FROM BED TO CHAIR: A LITTLE
HELP NEEDED FOR BATHING: A LOT
WALKING IN HOSPITAL ROOM: A LITTLE
MOBILITY SCORE: 15
DRESSING REGULAR UPPER BODY CLOTHING: A LITTLE
MOVING TO AND FROM BED TO CHAIR: A LITTLE
CLIMB 3 TO 5 STEPS WITH RAILING: A LOT
TOILETING: A LITTLE
DRESSING REGULAR LOWER BODY CLOTHING: A LITTLE
MOBILITY SCORE: 17
PERSONAL GROOMING: A LOT
STANDING UP FROM CHAIR USING ARMS: A LITTLE
TURNING FROM BACK TO SIDE WHILE IN FLAT BAD: A LOT
MOVING FROM LYING ON BACK TO SITTING ON SIDE OF FLAT BED WITH BEDRAILS: A LITTLE
MOBILITY SCORE: 17
TURNING FROM BACK TO SIDE WHILE IN FLAT BAD: A LITTLE
WALKING IN HOSPITAL ROOM: A LITTLE
STANDING UP FROM CHAIR USING ARMS: A LOT
MOVING FROM LYING ON BACK TO SITTING ON SIDE OF FLAT BED WITH BEDRAILS: A LITTLE
DRESSING REGULAR LOWER BODY CLOTHING: A LOT
MOVING FROM LYING ON BACK TO SITTING ON SIDE OF FLAT BED WITH BEDRAILS: A LITTLE
CLIMB 3 TO 5 STEPS WITH RAILING: A LOT
DAILY ACTIVITIY SCORE: 15
EATING MEALS: A LITTLE
WALKING IN HOSPITAL ROOM: A LITTLE
STANDING UP FROM CHAIR USING ARMS: A LITTLE
PATIENT BASELINE BEDBOUND: NO

## 2023-11-30 ASSESSMENT — PAIN SCALES - PAIN ASSESSMENT IN ADVANCED DEMENTIA (PAINAD)
BREATHING: NORMAL
CONSOLABILITY: NO NEED TO CONSOLE
TOTALSCORE: 0
BODYLANGUAGE: RELAXED
FACIALEXPRESSION: SMILING OR INEXPRESSIVE

## 2023-11-30 ASSESSMENT — PAIN - FUNCTIONAL ASSESSMENT
PAIN_FUNCTIONAL_ASSESSMENT: 0-10
PAIN_FUNCTIONAL_ASSESSMENT: 0-10

## 2023-11-30 ASSESSMENT — PAIN SCALES - GENERAL
PAINLEVEL_OUTOF10: 0 - NO PAIN

## 2023-11-30 ASSESSMENT — PAIN SCALES - WONG BAKER: WONGBAKER_NUMERICALRESPONSE: NO HURT

## 2023-11-30 NOTE — NURSING NOTE
Pt new admission; arrived to the floor via cart , pt alert and orient. Respirations even and unlabored. Denies any needs no s/s of pain or discomfort. Call light and possession within reach. Bed in lowest position.

## 2023-11-30 NOTE — H&P
History Of Present Illness  Ina Mcgowan is a 89 y.o. female presenting with difficulty swallowing after attempting to swallow a cholesterol pill..  The patient has previously had no history of difficulty swallowing solids or liquids.  She was trying to swallow a pill which she indicates is a cholesterol pill, I do not however see a cholesterol pill listed among home medicines.  While attempting to swallow the pill which was large in size, she felt that it got stuck on the way down.  She began to have a lot of secretions.  She was trying to bring the pill up but was unable to.  After she arrived in the emergency department, the gastroenterologist was called and came to see the patient.  At about that time she successfully swallowed the pill.  She was apparently still having difficulty keeping quids down so decision was made to admit for observation.  CT scan of the abdomen and pelvis showed pneumobilia which was thought to be likely related to sphincterotomy/instrumentation involved in a previous cholecystectomy     Past Medical History  Past Medical History:   Diagnosis Date    Atrial fibrillation (CMS/HCC)     Hyperglycemia     Hyperlipidemia     Hypertension     Hypothyroidism     Osteoarthritis     PMR (polymyalgia rheumatica) (CMS/HCC)     Vitamin D deficiency        Surgical History  Past Surgical History:   Procedure Laterality Date    CATARACT EXTRACTION Bilateral     CHOLECYSTECTOMY      COLONOSCOPY  02/11/2009        Social History  She reports that she has never smoked. She has never been exposed to tobacco smoke. She has never used smokeless tobacco. She reports that she does not drink alcohol. No history on file for drug use.    Family History  Family History   Problem Relation Name Age of Onset    Heart disease Mother      Heart disease Father          Allergies  Patient has no known allergies.    Review of Systems   General: Negative for fever,  chills or fatigue.    HEENT: Negative for headache,  blurring of vision or double vision.    Cardiovascular: Negative for chest pain, palpitations or orthopnea.    Respiratory: Negative for cough, shortness of breath or wheezing.    Gastrointestinal: As in the HPI   Genitourinary: Negative for dysuria, hematuria, frequency or nocturia.   Musculoskeletal: Negative for joint pain, joint swelling or deformity.   Skin: Negative for rash, itching, or jaundice.   Hematologic: Negative for bleeding or bruising.   Neurologic: Negative for headache, loss of consciousness. syncope or seizures       Physical Exam   General.: Awake alert well-developed, responsive   HEENT: Normocephalic, not icteric, not pale, no facial asymmetry, no pharyngeal erythema.   Neck: Supple, no carotid bruit, no thyroid enlargement.   Cardiovascular: Regular heart rate and rhythm normal S1 and S2.   Respiratory: Equal breath sounds bilaterally clear to auscultation.   Abdomen: Soft, nontender to palpation, bowel sounds present and normoactive.   Extremities: There was an area of erythema on the distal anterior right leg with mild warmth and tenderness, no swelling.  There is a small oval-shaped superficial ulcer in the center of the erythematous region with no drainage or bleeding.  Both pedal pulses were easily palpable bilaterally.   Neurologic: Alert and oriented to self, place and date, muscle strength 5/5 in all extremities.   Dermatologic: No rash, ecchymosis, or jaundice.   Psychological: Appropriate affect and behavior.     Last Recorded Vitals  Results for orders placed or performed during the hospital encounter of 11/29/23 (from the past 24 hour(s))   CBC and Auto Differential   Result Value Ref Range    WBC 9.4 4.4 - 11.3 x10*3/uL    nRBC 0.0 0.0 - 0.0 /100 WBCs    RBC 3.74 (L) 4.00 - 5.20 x10*6/uL    Hemoglobin 11.6 (L) 12.0 - 16.0 g/dL    Hematocrit 37.1 36.0 - 46.0 %    MCV 99 80 - 100 fL    MCH 31.0 26.0 - 34.0 pg    MCHC 31.3 (L) 32.0 - 36.0 g/dL    RDW 13.8 11.5 - 14.5 %    Platelets  287 150 - 450 x10*3/uL    Neutrophils % 72.0 40.0 - 80.0 %    Immature Granulocytes %, Automated 0.9 0.0 - 0.9 %    Lymphocytes % 17.2 13.0 - 44.0 %    Monocytes % 9.5 2.0 - 10.0 %    Eosinophils % 0.1 0.0 - 6.0 %    Basophils % 0.3 0.0 - 2.0 %    Neutrophils Absolute 6.73 (H) 1.60 - 5.50 x10*3/uL    Immature Granulocytes Absolute, Automated 0.08 0.00 - 0.50 x10*3/uL    Lymphocytes Absolute 1.61 0.80 - 3.00 x10*3/uL    Monocytes Absolute 0.89 (H) 0.05 - 0.80 x10*3/uL    Eosinophils Absolute 0.01 0.00 - 0.40 x10*3/uL    Basophils Absolute 0.03 0.00 - 0.10 x10*3/uL   Basic metabolic panel   Result Value Ref Range    Glucose 133 (H) 65 - 99 mg/dL    Sodium 142 133 - 145 mmol/L    Potassium 4.2 3.4 - 5.1 mmol/L    Chloride 105 97 - 107 mmol/L    Bicarbonate 27 24 - 31 mmol/L    Urea Nitrogen 36 (H) 8 - 25 mg/dL    Creatinine 1.00 0.40 - 1.60 mg/dL    eGFR 52 (L) >60 mL/min/1.73m*2    Calcium 9.4 8.5 - 10.4 mg/dL    Anion Gap 10 <=19 mmol/L   Lipase   Result Value Ref Range    Lipase 66 (H) 16 - 63 U/L   Protime-INR   Result Value Ref Range    Protime 37.0 (H) 9.3 - 12.7 seconds    INR 3.8 (H) 0.9 - 1.2   aPTT   Result Value Ref Range    aPTT 42.3 (H) 22.0 - 32.5 seconds     CT abdomen pelvis w IV contrast    Result Date: 11/29/2023  Interpreted By:  Osiris Steward, STUDY: CT ABDOMEN PELVIS W IV CONTRAST; 11/29/2023 5:15 pm   INDICATION: Signs/Symptoms:epigastric pain / vomiting;   COMPARISON: None   ACCESSION NUMBER(S): [id number]   ORDERING CLINICIAN: KEYONNA YODRE   TECHNIQUE: Contiguous axial images of the abdomen/pelvis were performed with IV contrast. 75 ml of Omnipaque 350 was utilized. All CT examinations are performed with 1 or more of the following dose reduction techniques: Automated exposure control, adjustment of mA and/or kv according to patient's size, or use of iterative reconstruction techniques.   FINDINGS:   The liver,  common bile duct, pancreas, spleen, and adrenal glands are unremarkable.  Pneumobilia is noted and may be related to with sphincterotomy/surgical intervention.   The kidneys enhance symmetrically.  No urolithiasis is seen. No hydroureteronephrosis is seen.   The visualized aorta is unremarkable.   The small bowel is not dilated. The appendix is normal. There is diffuse colonic diverticulosis without evidence of diverticulitis.   No free intraperitoneal air or fluid is seen.   The bladder is well distended with no gross wall thickening.   The visualized osseous structures are intact.   Limited images of the lower thorax are unremarkable.       No acute abdominopelvic pathology. Status post cholecystectomy. Pneumobilia which is noted likely related to for sphincterotomy/instrumentation. Small hiatal hernia. There is diffuse colonic diverticulosis without evidence of diverticulitis.   MACRO: None   Signed by: Osiris Steward 11/29/2023 5:30 PM Dictation workstation:   [id number]    XR chest 1 view    Result Date: 11/29/2023  Interpreted By:  Wilman Barcenas, STUDY: XR CHEST 1 VIEW; 11/29/2023 2:35 pm   INDICATION: Signs/Symptoms:food impaction.   COMPARISON: 08/25/2023   ACCESSION NUMBER(S): [id number]   ORDERING CLINICIAN: KEYONNA YODER   TECHNIQUE: 1 view of the chest was performed.   FINDINGS: The lungs are adequately inflated. No acute consolidation. Minimal left basilar opacity likely atelectasis. No pleural effusion. No pneumothorax.  The cardiomediastinal silhouette is within normal limits. Degenerative changes of the shoulder joints.       No acute cardiopulmonary disease.   Signed by: Wilman Barcenas 11/29/2023 4:01 PM Dictation workstation:   [id number]        Assessment/Plan   Principal Problem:    Nausea & vomiting    Dysphagia related to difficulty swallowing a pill  Improving and potentially resolved.  No prior history of dysphagia.  Gastroenterology consult.  Per the ER physician has already been seen    Cellulitis of the right lower leg  Antibiotic coverage  ceftriaxone and vancomycin.  ID consult    Atrial fibrillation  Heart rate is controlled.  On metoprolol succinate     Long-term anticoagulant use  She is on Coumadin for atrial fibrillation.  INR is supratherapeutic at 3.8.  Hold Coumadin and repeat INR    Polymyalgia  rheumatica  She is on daily prednisone    Hypertension  On amlodipine    Hypothyroidism  On levothyroxine    CODE STATUS was discussed.  In the event of a cardiac arrest, she does not want chest compressions or intubation.  DNR AFIA, DNI    Su Ponce MD

## 2023-11-30 NOTE — PROGRESS NOTES
"Ina Mcgowan is a 89 y.o. female on day 0 of admission presenting with Nausea & vomiting.    Subjective   HPI   Patient sitting in her chair denies any fever or chills.  Open area in her right lower extremitywith   Patient denies any chest pain, shortness of breath in room air.  Patient tolerates well her diet with no nausea vomiting or abdominal pain.  No fever or chills.  No headache or dizziness.      Objective     Last Recorded Vitals  Blood pressure 161/67, pulse 88, temperature 36.8 °C (98.2 °F), temperature source Oral, resp. rate 18, height 1.575 m (5' 2\"), weight 72 kg (158 lb 11.7 oz), SpO2 96 %.      Intake/Output Summary (Last 24 hours) at 11/30/2023 0828  Last data filed at 11/30/2023 0558  Gross per 24 hour   Intake 1025 ml   Output 1 ml   Net 1024 ml         Physical Exam   General: alert, no diaphoresis   HENT: mucous membranes moist, external ears normal, no rhinorrhea   Eyes: no icterus or injection, no discharge   Lungs: CTA BL   Heart: RRR, no murmurs, no LE edema BL   GI: abdomen soft, nontender, nondistended, BS present   MSK: no joint effusion or deformity   Skin: no rashes, erythema, or ecchymosis   Neuro: grossly normal cognition, motor strength, sensation   Relevant Results    Lab Results   Component Value Date    WBC 19.2 (H) 11/30/2023    HGB 10.7 (L) 11/30/2023    HCT 31.5 (L) 11/30/2023    MCV 92 11/30/2023     11/30/2023       Lab Results   Component Value Date    GLUCOSE 144 (H) 11/30/2023    CALCIUM 8.3 (L) 11/30/2023     11/30/2023    K 4.0 11/30/2023    CO2 24 11/30/2023     11/30/2023    BUN 26 (H) 11/30/2023    CREATININE 0.80 11/30/2023       Lab Results   Component Value Date    HGBA1C 7.0 (H) 04/25/2023       CT abdomen pelvis w IV contrast    Result Date: 11/29/2023  Interpreted By:  Osiris Steward, STUDY: CT ABDOMEN PELVIS W IV CONTRAST; 11/29/2023 5:15 pm   INDICATION: Signs/Symptoms:epigastric pain / vomiting;   COMPARISON: None   ACCESSION " NUMBER(S): [id number]   ORDERING CLINICIAN: KEYONNA YODER   TECHNIQUE: Contiguous axial images of the abdomen/pelvis were performed with IV contrast. 75 ml of Omnipaque 350 was utilized. All CT examinations are performed with 1 or more of the following dose reduction techniques: Automated exposure control, adjustment of mA and/or kv according to patient's size, or use of iterative reconstruction techniques.   FINDINGS:   The liver,  common bile duct, pancreas, spleen, and adrenal glands are unremarkable. Pneumobilia is noted and may be related to with sphincterotomy/surgical intervention.   The kidneys enhance symmetrically.  No urolithiasis is seen. No hydroureteronephrosis is seen.   The visualized aorta is unremarkable.   The small bowel is not dilated. The appendix is normal. There is diffuse colonic diverticulosis without evidence of diverticulitis.   No free intraperitoneal air or fluid is seen.   The bladder is well distended with no gross wall thickening.   The visualized osseous structures are intact.   Limited images of the lower thorax are unremarkable.       No acute abdominopelvic pathology. Status post cholecystectomy. Pneumobilia which is noted likely related to for sphincterotomy/instrumentation. Small hiatal hernia. There is diffuse colonic diverticulosis without evidence of diverticulitis.   MACRO: None   Signed by: Osiris Steward 11/29/2023 5:30 PM Dictation workstation:   [id number]    XR chest 1 view    Result Date: 11/29/2023  Interpreted By:  Wilman Barcenas, STUDY: XR CHEST 1 VIEW; 11/29/2023 2:35 pm   INDICATION: Signs/Symptoms:food impaction.   COMPARISON: 08/25/2023   ACCESSION NUMBER(S): [id number]   ORDERING CLINICIAN: KEYONNA YODER   TECHNIQUE: 1 view of the chest was performed.   FINDINGS: The lungs are adequately inflated. No acute consolidation. Minimal left basilar opacity likely atelectasis. No pleural effusion. No pneumothorax.  The cardiomediastinal silhouette is  within normal limits. Degenerative changes of the shoulder joints.       No acute cardiopulmonary disease.   Signed by: Wilman Barcenas 11/29/2023 4:01 PM Dictation workstation:   [id number]    Scheduled medications  amLODIPine, 2.5 mg, oral, Daily  cefTRIAXone, 1 g, intravenous, q24h  chlorthalidone, 25 mg, oral, Every Mon/Wed/Fri  colestipol, 2 g, oral, BID  gabapentin, 100 mg, oral, TID  latanoprost, 1 drop, Both Eyes, Nightly  levothyroxine, 100 mcg, oral, Daily before breakfast  melatonin, 3 mg, oral, Daily  metoprolol succinate XL, 25 mg, oral, Daily  pantoprazole, 40 mg, intravenous, BID  potassium chloride CR, 10 mEq, oral, Daily  predniSONE, 5 mg, oral, Daily  vancomycin, 1,000 mg, intravenous, q24h      Continuous medications  sodium chloride 0.9%, 125 mL/hr, Last Rate: 125 mL/hr (11/30/23 0641)      PRN medications  PRN medications: acetaminophen **OR** acetaminophen **OR** acetaminophen, hydrALAZINE, ondansetron ODT **OR** ondansetron, polyethylene glycol    Assessment/Plan   Principal Problem:    Nausea & vomiting    Cellulitis of the right lower leg  Antibiotic coverage ceftriaxone and vancomycin.  ID consult     Atrial fibrillation  Heart rate is controlled.  On metoprolol succinate   on Coumadin .  INR is supratherapeutic at 3.9.  Hold Coumadin and repeat INR     Dysphagia related to difficulty swallowing a pill  resolved.    Gastroenterology consult    Polymyalgia  rheumatica  She is on daily prednisone     Hypertension  On amlodipine     Hypothyroidism  On levothyroxine     Discharge plan:  Discussed with the ID NP okay to discharge patient on oral antibiotic for 14 days and follow-up with the cultures as outpatient.    Anticipate discharging patient home today     I spent 25 minutes in the professional and overall care of this patient.    Rea Betancur, APRN-CNP

## 2023-11-30 NOTE — PROGRESS NOTES
Physical Therapy    Physical Therapy Evaluation    Patient Name: Ina Mcgowan  MRN: [id number]  Today's Date: 11/30/2023   Time Calculation  Start Time: 0818  Stop Time: 0835  Time Calculation (min): 17 min    Assessment/Plan   PT Assessment  PT Assessment Results: Decreased strength, Decreased endurance, Impaired balance, Decreased mobility, Decreased coordination, Decreased safety awareness  Rehab Prognosis: Good  Evaluation/Treatment Tolerance: Patient tolerated treatment well  End of Session Communication: Bedside nurse  Assessment Comment: 89 year old female presents with decline from baseline functional mobility,  fall risk,  impaired balacne,  and decreased tolerance to activity.  End of Session Patient Position: Up in chair  IP OR SWING BED PT PLAN  Inpatient or Swing Bed: Inpatient  PT Plan  Treatment/Interventions: Bed mobility, Transfer training, Gait training, Balance training, Strengthening, Endurance training, Therapeutic exercise, Therapeutic activity  PT Plan: Skilled PT  PT Frequency: 4 times per week  PT Discharge Recommendations: Moderate intensity level of continued care  PT Recommended Transfer Status: Assist x1  PT - OK to Discharge: Yes (with skilled physical therapy at next level of care.)      Subjective   General Visit Information:  General  Reason for Referral: Impaired mobility with nausea and vomiting  Past Medical History Relevant to Rehab: AFIB, hyperlipid, HTN, hypothyroid, OA, polymyalgia rheumatic, choley, right TKR  Prior to Session Communication: Bedside nurse  Patient Position Received: Bed, 3 rail up  General Comment: 89 year old female admit from [facility] with difficulty swallowing;  patient had difficulty swallowing large pill at home.  Home Living:  Home Living  Type of Home: Apartment  Lives With: Alone  Home Adaptive Equipment: Walker rolling or standard  Home Layout: One level  Home Access: Elevator  Bathroom Shower/Tub: Tub/shower unit  Bathroom  Equipment: Tub transfer bench  Home Living Comments: Aide for assist with bathing 2 times per week  Prior Level of Function:  Prior Function Per Pt/Caregiver Report  Ambulatory Assistance:  (mod independent with rolling walker)  Precautions:  Precautions  Medical Precautions: Fall precautions  Vital Signs:       Objective   Pain:  Pain Assessment  Pain Assessment: 0-10  Pain Score: 0 - No pain  Cognition:  Cognition  Overall Cognitive Status: Within Functional Limits    General Assessments:  General Observation  General Observation: mild intermittent shaking fan UE noted during mobility             Activity Tolerance  Endurance: Decreased tolerance for upright activites    Sensation  Light Touch: No apparent deficits    Coordination  Movements are Fluid and Coordinated: No  Lower Body Coordination: slower rate of movement fan LE during mobility    Static Sitting Balance  Static Sitting-Balance Support: Bilateral upper extremity supported  Static Sitting-Level of Assistance: Minimum assistance  Static Sitting-Comment/Number of Minutes: Assist with trunk support due to decreased balance posterior    Static Standing Balance  Static Standing-Balance Support: Bilateral upper extremity supported  Static Standing-Level of Assistance: Moderate assistance  Static Standing-Comment/Number of Minutes: Walker  Functional Assessments:  Bed Mobility  Bed Mobility: Yes  Bed Mobility 1  Bed Mobility 1: Supine to sitting  Level of Assistance 1: Moderate assistance  Bed Mobility Comments 1: Assist with trunk-up during supine to sit;  min assist to scoot hips to edge of bed during supine to sit.    Transfers  Transfer: Yes  Transfer 1  Transfer From 1: Sit to  Transfer to 1: Stand  Technique 1: Sit to stand  Transfer Device 1: Walker  Transfer Level of Assistance 1: Moderate assistance  Trials/Comments 1: Assist with trunk support and balance during sit to stand from bed;  assist required due to mild retorpulse.  Transfers 2  Transfer  From 2: Stand to  Transfer to 2: Sit  Technique 2: Stand to sit  Transfer Device 2: Walker  Transfer Level of Assistance 2: Moderate assistance  Trials/Comments 2: Assist with trunk support during stand to sit;  verbal cues for hand placement.  Transfers 3  Transfer From 3: Bed to  Transfer to 3: Chair with arms  Technique 3: Stand pivot  Transfer Device 3: Walker  Transfer Level of Assistance 3: Moderate assistance  Trials/Comments 3: Assist with trunk support and balance during stand pivot transfer bed to chair with rolling walker;  patient was able to support body weight with fan LE.    Ambulation/Gait Training  Ambulation/Gait Training Performed: No    Stairs  Stairs: No  Extremity/Trunk Assessments:  RLE   RLE : Exceptions to WFL  Strength RLE  R Hip Flexion: 3/5  R Knee Extension: 3+/5  R Ankle Dorsiflexion: 4-/5  LLE   LLE : Exceptions to WFL  Strength LLE  L Hip Flexion: 3/5  L Knee Extension: 3+/5  L Ankle Dorsiflexion: 4-/5  Outcome Measures:  [facility] Basic Mobility  Turning from your back to your side while in a flat bed without using bedrails: A little  Moving from lying on your back to sitting on the side of a flat bed without using bedrails: A lot  Moving to and from bed to chair (including a wheelchair): A little  Standing up from a chair using your arms (e.g. wheelchair or bedside chair): A lot  To walk in hospital room: A little  Climbing 3-5 steps with railing: A lot  Basic Mobility - Total Score: 15    Encounter Problems       Encounter Problems (Active)       Safety       Supervision with bed mobility including supine to sit and sit to supine. (Progressing)       Start:  11/30/23    Expected End:  12/14/23            Supervision with transfers including sit to stand and stand to sit. (Progressing)       Start:  11/30/23    Expected End:  12/14/23            Ambulate 100 feet with rolling walker and supervision. (Progressing)       Start:  11/30/23    Expected End:  12/14/23                    Education Documentation  Mobility Training, taught by Carlito Flores, PT at 11/30/2023  8:53 AM.  Learner: Patient  Readiness: Acceptance  Method: Explanation, Demonstration  Response: Needs Reinforcement    Education Comments  No comments found.

## 2023-11-30 NOTE — PROGRESS NOTES
"Vancomycin Dosing by Pharmacy- INITIAL    Ina Mcgowan is a 89 y.o. year old female who Pharmacy has been consulted for vancomycin dosing for cellulitis, skin and soft tissue. Based on the patient's indication and renal status this patient will be dosed based on a goal AUC of 400-600.     Renal function is currently stable.    Visit Vitals  BP (!) 168/96   Pulse 106   Temp 36.4 °C (97.5 °F) (Oral)   Resp 18        Lab Results   Component Value Date    CREATININE 1.00 11/29/2023    CREATININE 0.9 08/25/2023        Patient weight is 72 kg    No results found for: \"CULTURE\"     No intake/output data recorded.  [facility]          Assessment/Plan     Patient will not be given a loading dose.  Will initiate vancomycin maintenance,  1000 mg every 24 hours.    This dosing regimen is predicted by [employer]ight[employer] to result in the following pharmacokinetic parameters:  Loading dose: N/A  Regimen: 1000 mg IV every 24 hours.  Start time: 21:54 on 11/29/2023  Exposure target: AUC24 (range)400-600 mg/L.hr   AUC24,ss: 514 mg/L.hr  Probability of AUC24 > 400: 78 %  Ctrough,ss: 16.6 mg/L  Probability of Ctrough,ss > 20: 30 %  Probability of nephrotoxicity (Lodise VANESSA 2009): 12 %  Follow-up level will be ordered on 11/30 with AM labs unless clinically indicated sooner.  Will continue to monitor renal function daily while on vancomycin and order serum creatinine at least every 48 hours if not already ordered.  Follow for continued vancomycin needs, clinical response, and signs/symptoms of toxicity.       Alberto Khoury, PharmD       "

## 2023-11-30 NOTE — CONSULTS
Vancomycin Dosing by Pharmacy- Cessation of Therapy    Consult to pharmacy for vancomycin dosing has been discontinued by the prescriber, pharmacy will sign off at this time.    Please call pharmacy if there are further questions or re-enter a consult if vancomycin is resumed.     Meaghan Ceron, EileenD

## 2023-11-30 NOTE — CONSULTS
"Inpatient consult to Gastroenterology  Consult performed by: Teresa Brooks, LANRE-CNP  Consult ordered by: Su Ponce MD  Reason for consult: Dysphagia          Reason For Consult  Dysphagia    History Of Present Illness  Ina Mcgowan is a 89 y.o. female presenting with trouble swallowing. Patient took a pill yesterday, \"felt like it was stuck all day.\" She was spitting up secretions. Unable to tolerate anything PO at that time. Later, she felt as if the pill passed. She is able to tolerate water at bedside this morning. Denies any further nausea. Denies chronic dysphagia. She is hungry and wants to eat      Past Medical History  She has a past medical history of Atrial fibrillation (CMS/HCC), Hyperglycemia, Hyperlipidemia, Hypertension, Hypothyroidism, Osteoarthritis, PMR (polymyalgia rheumatica) (CMS/HCC), and Vitamin D deficiency.    Surgical History  She has a past surgical history that includes Cataract extraction (Bilateral); Colonoscopy (02/11/2009); and Cholecystectomy.     Social History  She reports that she has never smoked. She has never been exposed to tobacco smoke. She has never used smokeless tobacco. She reports that she does not drink alcohol. No history on file for drug use.    Family History  Family History   Problem Relation Name Age of Onset    Heart disease Mother      Heart disease Father          Allergies  Patient has no known allergies.    Review of Systems   Constitutional: Negative.    Respiratory: Negative.     Gastrointestinal: Negative.         Physical Exam  Constitutional:       Appearance: Normal appearance.   HENT:      Head: Normocephalic and atraumatic.      Mouth/Throat:      Mouth: Mucous membranes are moist.   Pulmonary:      Effort: Pulmonary effort is normal.   Abdominal:      General: There is no distension.      Palpations: Abdomen is soft.      Tenderness: There is no abdominal tenderness. There is no guarding.   Musculoskeletal:         General: " "Normal range of motion.      Cervical back: Normal range of motion.   Neurological:      General: No focal deficit present.      Mental Status: She is alert. Mental status is at baseline.   Psychiatric:         Mood and Affect: Mood normal.          Last Recorded Vitals  Blood pressure 161/67, pulse 88, temperature 36.8 °C (98.2 °F), temperature source Oral, resp. rate 18, height 1.575 m (5' 2\"), weight 72 kg (158 lb 11.7 oz), SpO2 96 %.    Relevant Results  Results for orders placed or performed during the hospital encounter of 11/29/23 (from the past 24 hour(s))   CBC and Auto Differential   Result Value Ref Range    WBC 9.4 4.4 - 11.3 x10*3/uL    nRBC 0.0 0.0 - 0.0 /100 WBCs    RBC 3.74 (L) 4.00 - 5.20 x10*6/uL    Hemoglobin 11.6 (L) 12.0 - 16.0 g/dL    Hematocrit 37.1 36.0 - 46.0 %    MCV 99 80 - 100 fL    MCH 31.0 26.0 - 34.0 pg    MCHC 31.3 (L) 32.0 - 36.0 g/dL    RDW 13.8 11.5 - 14.5 %    Platelets 287 150 - 450 x10*3/uL    Neutrophils % 72.0 40.0 - 80.0 %    Immature Granulocytes %, Automated 0.9 0.0 - 0.9 %    Lymphocytes % 17.2 13.0 - 44.0 %    Monocytes % 9.5 2.0 - 10.0 %    Eosinophils % 0.1 0.0 - 6.0 %    Basophils % 0.3 0.0 - 2.0 %    Neutrophils Absolute 6.73 (H) 1.60 - 5.50 x10*3/uL    Immature Granulocytes Absolute, Automated 0.08 0.00 - 0.50 x10*3/uL    Lymphocytes Absolute 1.61 0.80 - 3.00 x10*3/uL    Monocytes Absolute 0.89 (H) 0.05 - 0.80 x10*3/uL    Eosinophils Absolute 0.01 0.00 - 0.40 x10*3/uL    Basophils Absolute 0.03 0.00 - 0.10 x10*3/uL   Basic metabolic panel   Result Value Ref Range    Glucose 133 (H) 65 - 99 mg/dL    Sodium 142 133 - 145 mmol/L    Potassium 4.2 3.4 - 5.1 mmol/L    Chloride 105 97 - 107 mmol/L    Bicarbonate 27 24 - 31 mmol/L    Urea Nitrogen 36 (H) 8 - 25 mg/dL    Creatinine 1.00 0.40 - 1.60 mg/dL    eGFR 52 (L) >60 mL/min/1.73m*2    Calcium 9.4 8.5 - 10.4 mg/dL    Anion Gap 10 <=19 mmol/L   Lipase   Result Value Ref Range    Lipase 66 (H) 16 - 63 U/L   Protime-INR "   Result Value Ref Range    Protime 37.0 (H) 9.3 - 12.7 seconds    INR 3.8 (H) 0.9 - 1.2   aPTT   Result Value Ref Range    aPTT 42.3 (H) 22.0 - 32.5 seconds   CBC   Result Value Ref Range    WBC 19.2 (H) 4.4 - 11.3 x10*3/uL    nRBC 0.0 0.0 - 0.0 /100 WBCs    RBC 3.41 (L) 4.00 - 5.20 x10*6/uL    Hemoglobin 10.7 (L) 12.0 - 16.0 g/dL    Hematocrit 31.5 (L) 36.0 - 46.0 %    MCV 92 80 - 100 fL    MCH 31.4 26.0 - 34.0 pg    MCHC 34.0 32.0 - 36.0 g/dL    RDW 13.6 11.5 - 14.5 %    Platelets 261 150 - 450 x10*3/uL   Basic Metabolic Panel   Result Value Ref Range    Glucose 144 (H) 65 - 99 mg/dL    Sodium 142 133 - 145 mmol/L    Potassium 4.0 3.4 - 5.1 mmol/L    Chloride 106 97 - 107 mmol/L    Bicarbonate 24 24 - 31 mmol/L    Urea Nitrogen 26 (H) 8 - 25 mg/dL    Creatinine 0.80 0.40 - 1.60 mg/dL    eGFR 68 >60 mL/min/1.73m*2    Calcium 8.3 (L) 8.5 - 10.4 mg/dL    Anion Gap 12 <=19 mmol/L   Protime-INR   Result Value Ref Range    Protime 38.2 (H) 9.3 - 12.7 seconds    INR 3.9 (H) 0.9 - 1.2   Vancomycin   Result Value Ref Range    Vancomycin       CT abdomen pelvis w IV contrast    Result Date: 11/29/2023  Interpreted By:  Osiris Steward, STUDY: CT ABDOMEN PELVIS W IV CONTRAST; 11/29/2023 5:15 pm   INDICATION: Signs/Symptoms:epigastric pain / vomiting;   COMPARISON: None   ACCESSION NUMBER(S): [id number]   ORDERING CLINICIAN: KEYONNA YODER   TECHNIQUE: Contiguous axial images of the abdomen/pelvis were performed with IV contrast. 75 ml of Omnipaque 350 was utilized. All CT examinations are performed with 1 or more of the following dose reduction techniques: Automated exposure control, adjustment of mA and/or kv according to patient's size, or use of iterative reconstruction techniques.   FINDINGS:   The liver,  common bile duct, pancreas, spleen, and adrenal glands are unremarkable. Pneumobilia is noted and may be related to with sphincterotomy/surgical intervention.   The kidneys enhance symmetrically.  No urolithiasis  "is seen. No hydroureteronephrosis is seen.   The visualized aorta is unremarkable.   The small bowel is not dilated. The appendix is normal. There is diffuse colonic diverticulosis without evidence of diverticulitis.   No free intraperitoneal air or fluid is seen.   The bladder is well distended with no gross wall thickening.   The visualized osseous structures are intact.   Limited images of the lower thorax are unremarkable.       No acute abdominopelvic pathology. Status post cholecystectomy. Pneumobilia which is noted likely related to for sphincterotomy/instrumentation. Small hiatal hernia. There is diffuse colonic diverticulosis without evidence of diverticulitis.   MACRO: None   Signed by: Osiris Steward 11/29/2023 5:30 PM Dictation workstation:   [id number]    XR chest 1 view    Result Date: 11/29/2023  Interpreted By:  Wilman Barcenas, STUDY: XR CHEST 1 VIEW; 11/29/2023 2:35 pm   INDICATION: Signs/Symptoms:food impaction.   COMPARISON: 08/25/2023   ACCESSION NUMBER(S): [id number]   ORDERING CLINICIAN: KEYONNA YODER   TECHNIQUE: 1 view of the chest was performed.   FINDINGS: The lungs are adequately inflated. No acute consolidation. Minimal left basilar opacity likely atelectasis. No pleural effusion. No pneumothorax.  The cardiomediastinal silhouette is within normal limits. Degenerative changes of the shoulder joints.       No acute cardiopulmonary disease.   Signed by: Wilman Barcenas 11/29/2023 4:01 PM Dictation workstation:   [id number]        Assessment/Plan     Dysphagia   -Following medication, \"felt stuck.\" She is now able to tolerate liquids. Will trial diet. If able to eat breakfast, she can be discharged   NV (resolved, this was mostly secretions)    -As above     Patient should call our office to follow up with any repeat dysphagia. We could consider EGD vs esophagram as needed   I spent 30 minutes in the professional and overall care of this patient.          "

## 2023-11-30 NOTE — PROGRESS NOTES
TCC RN met with patient in room, introduced self and explained reason for assessment . Patient lives at [facility]. Patient's son Rommel and Daughter Mojgan help with shopping and appointments. Patient uses a walker at home. Cleaning and meals are provided by the assisted living. Denied any mental health history. Denied any smoking , drinking or drug use. PCP is Bianca Campbell. Local pharmacy of choice is [facility] in [place]. Patient is agreeable to rehab with [facility] .Will send referral . Patient declined SNF .  Plan to discharge back to [facility] when medically ready .    11/30/23 1111   Discharge Planning   Living Arrangements Other (Comment)  ([facility])   Support Systems Children   Assistance Needed uses walker , cleaning and meals provided , son does shopping   Type of Residence Assisted living   Number of Stairs to Enter Residence 0   Care Facility Name [facility]   Home or Post Acute Services None   Patient expects to be discharged to: [facility]   Does the patient need discharge transport arranged? Yes   RoundTrip coordination needed? Yes   Has discharge transport been arranged? No

## 2023-11-30 NOTE — PROGRESS NOTES
Occupational Therapy    Evaluation/Treatment    Patient Name: Ina Mcgowan  MRN: [id number]  : 1928  Today's Date: 23  Time Calculation  Start Time: 0750  Stop Time: 0810  Time Calculation (min): 20 min       Assessment:  OT Assessment: Pt presents on eval with increased overall weakness, decreased activity tolerance, and impaired standing balance affecting self-care and functional transfers/mobility. Pt will benefit from continued skilled OT to address these deficits.  Prognosis: Fair  Evaluation/Treatment Tolerance: Patient limited by fatigue  End of Session Communication: Bedside nurse  End of Session Patient Position: Up in chair (Needs in reach.)  OT Assessment Results: Decreased ADL status, Decreased upper extremity range of motion, Decreased upper extremity strength, Decreased endurance, Decreased fine motor control, Decreased functional mobility, Decreased gross motor control  Prognosis: Fair  Evaluation/Treatment Tolerance: Patient limited by fatigue    Plan:  Treatment Interventions: ADL retraining, Functional transfer training, UE strengthening/ROM, Endurance training, Patient/family training, Equipment evaluation/education, Neuromuscular reeducation, Fine motor coordination activities, Compensatory technique education, Continued evaluation  OT Frequency: 4 times per week  OT Discharge Recommendations: Moderate intensity level of continued care  OT - OK to Discharge: Yes  Treatment Interventions: ADL retraining, Functional transfer training, UE strengthening/ROM, Endurance training, Patient/family training, Equipment evaluation/education, Neuromuscular reeducation, Fine motor coordination activities, Compensatory technique education, Continued evaluation    Subjective     General:   OT Received On: 23  General  Reason for Referral: N/V, weakness, impaired ADL's  Referred By: Dr. Ponce  Past Medical History Relevant to Rehab: AFIB, hyperlipid, HTN, hypothyroid, OA, polymyalgia  rheumatic, choley, right TKR  Family/Caregiver Present: No  Prior to Session Communication: Bedside nurse  Patient Position Received: Bed, 3 rail up  General Comment: Cleared by nurse prior to session and pt agreeable to OT eval requesting to get into a chair.    Precautions:  Medical Precautions: Fall precautions    Pain:  Pain Assessment  Pain Assessment: 0-10  Pain Score: 0 - No pain    Objective     Cognition:  Overall Cognitive Status: Within Functional Limits  Orientation Level: Oriented X4  Cognition Comments: mild age-related deficits only    Home Living:  Type of Home: Apartment  Lives With: Alone  Home Adaptive Equipment: Walker rolling or standard  Home Layout: One level  Home Access: Elevator  Bathroom Shower/Tub: Tub/shower unit  Bathroom Equipment: Tub transfer bench  Home Living Comments: Aide for assist with bathing 2 times per week    Prior Function:  Level of [place]: Independent with ADLs and functional transfers (pt only needs assist for showers)  Receives Help From: Other (Comment) (Aide PRN)  ADL Assistance: Independent  Homemaking Assistance: Needs assistance  Ambulatory Assistance: Independent (mod independent with rolling walker)  Vocational: Retired ()  Hand Dominance: Right    ADL:  Eating Assistance: Independent  Grooming Assistance: Moderate  Grooming Deficit: Standing with assistive device  Bathing Assistance: Moderate  UE Dressing Assistance: Minimal  LE Dressing Assistance: Maximal  Toileting Assistance with Device: Moderate    Activity Tolerance:  Activity Tolerance Comments: impaired    Bed Mobility/Transfers: Bed Mobility  Bed Mobility:  (Pt completed supine to sit EOB with mod A required for trunk up and extended time needed.)    Transfers  Transfer:  (Pt completed sit to stand from the bed and transferred to the chair using a RW with mod A required for balance and safety.)    Vision:Vision - Basic Assessment  Current Vision: Wears glasses all the  time    Sensation:  Sensation Comment: HENNY's WFL    Strength:  Strength Comments: BUEDITA's 1 to 2/5 shoulders, 3+/5 distally    Coordination:  Coordination Comment: HENNY's impaired with tremors noted, which pt reports is fairly recent and new     Hand Function:  Hand Function  Gross Grasp: Functional  Coordination: Impaired      Outcome Measures: [facility] Daily Activity  Putting on and taking off regular lower body clothing: A lot  Bathing (including washing, rinsing, drying): A lot  Putting on and taking off regular upper body clothing: A little  Toileting, which includes using toilet, bedpan or urinal: A lot  Taking care of personal grooming such as brushing teeth: A lot  Eating Meals: None  Daily Activity - Total Score: 15      Education Documentation  Home Exercise Program, taught by Zen Jules Jr., OT at 11/30/2023 10:01 AM.  Learner: Patient  Readiness: Acceptance  Method: Explanation  Response: Verbalizes Understanding    ADL Training, taught by Zen Jules Jr. OT at 11/30/2023 10:01 AM.  Learner: Patient  Readiness: Acceptance  Method: Explanation  Response: Verbalizes Understanding    Education Comments  No comments found.         Goals:  Problem: OT Goals  Goal: Pt will complete all functional transfers and mobility with distant S using a RW.  Outcome: Progressing  Goal: Pt will complete all grooming tasks with distant S in standing.  Outcome: Progressing  Goal: Pt will complete all toileting tasks with distant S.  Outcome: Progressing  Goal: Pt will complete UB dressing with setup/mod indep and LB dressing with distant S using AE as needed.   Outcome: Progressing

## 2023-11-30 NOTE — CARE PLAN
The patient's goals for the shift include  no nausea and vomiting    The clinical goals for the shift include pt will be free of nausea and vommiting

## 2023-11-30 NOTE — NURSING NOTE
Pt is npo, vomiting and having bloody emesis. Call placed to Dr Ponce. Hospitalist  called back . I made her aware pt increased blood pressure.  Hospitalist stated she would check the patients orders and update as neccessary.

## 2023-11-30 NOTE — PROGRESS NOTES
Ina Mcgowan is a 89 y.o. female on day 0 of admission presenting with Nausea & vomiting.          Referral sent to [facility], they accepted patient.          Dana Stephenson RN

## 2023-11-30 NOTE — PROGRESS NOTES
"Vancomycin Dosing by Pharmacy- FOLLOW UP    Ina Mcgowan is a 89 y.o. year old female who Pharmacy has been consulted for vancomycin dosing for cellulitis, skin and soft tissue. Based on the patient's indication and renal status this patient is being dosed based on a goal AUC of 400-600.     Renal function is currently improving.    Current vancomycin dose: 1000 mg given every 24 hours    Estimated vancomycin AUC on current dose: 460 mg/L.hr     Visit Vitals  /64 (BP Location: Left arm, Patient Position: Lying)   Pulse 86   Temp 36.7 °C (98.1 °F) (Oral)   Resp 18        Lab Results   Component Value Date    CREATININE 0.80 11/30/2023    CREATININE 1.00 11/29/2023    CREATININE 0.9 08/25/2023        Patient weight is No results found for: \"PTWEIGHT\"    No results found for: \"CULTURE\"     I/O last 3 completed shifts:  In: 1025 (14.2 mL/kg) [I.V.:1025 (14.2 mL/kg)]  Out: 1 (0 mL/kg) [Emesis/NG output:1]  Weight: 72 kg   [unfilled]    No results found for: \"PATIENTTEMP\"     Assessment/Plan    Within goal AUC range. Continue current vancomycin regimen.    This dosing regimen is predicted by [employer]ight[employer] to result in the following pharmacokinetic parameters:  Loading dose: N/A  Regimen: 1000 mg IV every 24 hours.  Start time: 12:18 on 11/30/2023  Exposure target: AUC24 (range)400-600 mg/L.hr   AUC24,ss: 460 mg/L.hr  Probability of AUC24 > 400: 75 %  Ctrough,ss: 13.4 mg/L  Probability of Ctrough,ss > 20: 7 %  Probability of nephrotoxicity (Lodise VANESSA 2009): 9 %    The next level will be obtained within 7 days (12/7) unless change in renal function. May be obtained sooner if clinically indicated.   Will continue to monitor renal function daily while on vancomycin and order serum creatinine at least every 48 hours if not already ordered.  Follow for continued vancomycin needs, clinical response, and signs/symptoms of toxicity.       Meaghan Ceron, PharmD           "

## 2023-11-30 NOTE — CARE PLAN
Problem: Safety  Goal: Supervision with bed mobility including supine to sit and sit to supine.  Outcome: Progressing  Goal: Supervision with transfers including sit to stand and stand to sit.  Outcome: Progressing  Goal: Ambulate 100 feet with rolling walker and supervision.  Outcome: Progressing

## 2023-11-30 NOTE — CONSULTS
Inpatient consult to Infectious Diseases  Consult performed by: Mary Sanchez, APRN-CNP  Consult ordered by: Su Ponce MD  Reason for consult: Right leg cellulitis      Primary MD: Tegan Giordano MD    History Of Present Illness  Ina Mcgowan is a 89 y.o. female who presents to the emergency room with difficulty swallowing her pills.  We were consulted due to concern for right lower extremity cellulitis.  Upon examination she is sitting up in chair.  She reports she is going home today.  She denies any injury or trauma to right lower extremity.  She does have small wound to right lower extremity with surrounding mild erythema.   She denies recent injury or trauma.  She reports tenderness with palpation.  She is afebrile.  She denies fever chills nausea vomiting or diarrhea.  She denies abdominal pain.  Labs with leukocytosis.  Chest xray with no acute findings.  She is on IV Rocephin, vancomycin.       Past Medical History  She has a past medical history of Atrial fibrillation (CMS/[facility]), Hyperglycemia, Hyperlipidemia, Hypertension, Hypothyroidism, Osteoarthritis, PMR (polymyalgia rheumatica) (CMS/[facility]), and Vitamin D deficiency.    Surgical History  She has a past surgical history that includes Cataract extraction (Bilateral); Colonoscopy (02/11/2009); and Cholecystectomy.     Social History     Occupational History    Not on file   Tobacco Use    Smoking status: Never     Passive exposure: Never    Smokeless tobacco: Never   Substance and Sexual Activity    Alcohol use: Never    Drug use: Not on file    Sexual activity: Not on file     Travel History   Travel since 10/30/23    No documented travel since 10/30/23            Family History  Family History   Problem Relation Name Age of Onset    Heart disease Mother      Heart disease Father       Allergies  Patient has no known allergies.     Immunization History   Administered Date(s) Administered    Pfizer Purple Cap SARS-CoV-2  01/16/2021, 02/06/2021, 10/27/2021     Medications  Home medications:  Medications Prior to Admission   Medication Sig Dispense Refill Last Dose    chlorthalidone (Hygroton) 25 mg tablet Take 1 tablet (25 mg) by mouth once a day on Monday, Wednesday, and Friday.   Past Week    denosumab (Prolia) 60 mg/mL syringe Inject 1 mL (60 mg total) under the skin 1 time.   Past Week    gabapentin (Neurontin) 100 mg capsule Take 1 capsule (100 mg) by mouth 3 times a day.   Past Week    latanoprost 0.005 % drops, emulsion Administer 1 drop into affected eye(s) once daily at bedtime.   Past Week    levothyroxine (Synthroid, Levoxyl) 100 mcg tablet Take 1 tablet (100 mcg) by mouth once daily in the morning. Take before meals.   Past Week    metFORMIN (Glucophage) 500 mg tablet Take 1 tablet (500 mg) by mouth once daily with a meal.   Past Week    metoprolol succinate XL (Toprol-XL) 25 mg 24 hr tablet Take 1 tablet (25 mg) by mouth once daily.   Past Week    warfarin (Coumadin) 5 mg tablet Take 1 tablet (5 mg) by mouth see administration instructions. Sunday, Wednesday take 5 mg   Past Week    acetaminophen (Tylenol) 325 mg tablet Take 1,000 mg by mouth 2 times a day as needed for moderate pain (4 - 6).   Past Week    amLODIPine (Norvasc) 2.5 mg tablet Take 1 tablet (2.5 mg) by mouth once daily.   Past Week    colestipol (Colestid) 1 gram tablet Take 2 tablets (2 g) by mouth 2 times a day.   Past Week    potassium chloride CR 10 mEq ER tablet Take 1 tablet (10 mEq) by mouth once daily.   Past Week    predniSONE (Deltasone) 5 mg tablet Take 1 tablet (5 mg) by mouth once daily.   Past Week    warfarin (Coumadin) 10 mg tablet Take 1 tablet (10 mg) by mouth see administration instructions. Monday, Tuesday, Thursday, Friday, Saturday take 10 mg   Past Week     Current medications:  Scheduled medications  amLODIPine, 2.5 mg, oral, Daily  cefTRIAXone, 1 g, intravenous, q24h  chlorthalidone, 25 mg, oral, Every Mon/Wed/Fri  colestipol, 2 g,  oral, BID  gabapentin, 100 mg, oral, TID  latanoprost, 1 drop, Both Eyes, Nightly  levothyroxine, 100 mcg, oral, Daily before breakfast  melatonin, 3 mg, oral, Daily  metoprolol succinate XL, 25 mg, oral, Daily  pantoprazole, 40 mg, intravenous, BID  potassium chloride CR, 10 mEq, oral, Daily  predniSONE, 5 mg, oral, Daily  vancomycin, 1,000 mg, intravenous, q24h      Continuous medications  sodium chloride 0.9%, 125 mL/hr, Last Rate: 125 mL/hr (11/30/23 0641)      PRN medications  PRN medications: acetaminophen **OR** acetaminophen **OR** acetaminophen, hydrALAZINE, ondansetron ODT **OR** ondansetron, polyethylene glycol    Review of Systems   Constitutional: Negative.    HENT: Negative.     Eyes: Negative.    Respiratory: Negative.     Cardiovascular: Negative.    Gastrointestinal: Negative.    Endocrine: Negative.    Genitourinary: Negative.    Musculoskeletal: Negative.    Skin: Negative.         Right lower extremity wound with tenderness   Neurological: Negative.    Psychiatric/Behavioral: Negative.          Objective  Range of Vitals (last 24 hours)  Heart Rate:  []   Temp:  [36.5 °C (97.7 °F)-36.8 °C (98.2 °F)]   Resp:  [18]   BP: (153-180)/(62-96)   SpO2:  [94 %-97 %]   Daily Weight  11/29/23 : 72 kg (158 lb 11.7 oz)    Body mass index is 29.03 kg/m².     Physical Exam  Constitutional:       Appearance: Normal appearance.   HENT:      Head: Normocephalic and atraumatic.      Nose: Nose normal.      Mouth/Throat:      Mouth: Mucous membranes are moist.      Pharynx: Oropharynx is clear.   Eyes:      Extraocular Movements: Extraocular movements intact.      Conjunctiva/sclera: Conjunctivae normal.   Cardiovascular:      Rate and Rhythm: Normal rate and regular rhythm.   Pulmonary:      Effort: Pulmonary effort is normal.      Breath sounds: Normal breath sounds.   Abdominal:      General: Bowel sounds are normal.      Palpations: Abdomen is soft.   Musculoskeletal:         General: Normal range of  "motion.      Cervical back: Normal range of motion and neck supple.      Right lower leg: Edema present.   Skin:     Comments: Right lower extremity small wound with mild yoko wound erythema. Tenderness with palpation   Neurological:      General: No focal deficit present.      Mental Status: She is alert and oriented to person, place, and time. Mental status is at baseline.   Psychiatric:         Mood and Affect: Mood normal.         Behavior: Behavior normal.          Relevant Results  Outside Hospital Results  No  Labs  Results from last 72 hours   Lab Units 11/30/23  0550 11/29/23  1418   WBC AUTO x10*3/uL 19.2* 9.4   HEMOGLOBIN g/dL 10.7* 11.6*   HEMATOCRIT % 31.5* 37.1   PLATELETS AUTO x10*3/uL 261 287   NEUTROS PCT AUTO %  --  72.0   LYMPHS PCT AUTO %  --  17.2   MONOS PCT AUTO %  --  9.5   EOS PCT AUTO %  --  0.1     Results from last 72 hours   Lab Units 11/30/23  0550 11/29/23  1418   SODIUM mmol/L 142 142   POTASSIUM mmol/L 4.0 4.2   CHLORIDE mmol/L 106 105   CO2 mmol/L 24 27   BUN mg/dL 26* 36*   CREATININE mg/dL 0.80 1.00   GLUCOSE mg/dL 144* 133*   CALCIUM mg/dL 8.3* 9.4   ANION GAP mmol/L 12 10   EGFR mL/min/1.73m*2 68 52*         Estimated Creatinine Clearance: 39.1 mL/min (by C-G formula based on SCr of 0.8 mg/dL).  No results found for: \"CRP\", \"SEDRATE\"  No results found for: \"HIV1X2\", \"HIVCONF\", \"LYILIZ2AT\"  No results found for: \"HEPCABINIT\", \"HEPCAB\", \"HCVPCRQUANT\"  Microbiology  No results found for the last 90 days.        Imaging  CT abdomen pelvis w IV contrast    Result Date: 11/29/2023  Interpreted By:  Osiris Steward, STUDY: CT ABDOMEN PELVIS W IV CONTRAST; 11/29/2023 5:15 pm   INDICATION: Signs/Symptoms:epigastric pain / vomiting;   COMPARISON: None   ACCESSION NUMBER(S): [id number]   ORDERING CLINICIAN: KEYONNA YODER   TECHNIQUE: Contiguous axial images of the abdomen/pelvis were performed with IV contrast. 75 ml of Omnipaque 350 was utilized. All CT examinations are performed with " 1 or more of the following dose reduction techniques: Automated exposure control, adjustment of mA and/or kv according to patient's size, or use of iterative reconstruction techniques.   FINDINGS:   The liver,  common bile duct, pancreas, spleen, and adrenal glands are unremarkable. Pneumobilia is noted and may be related to with sphincterotomy/surgical intervention.   The kidneys enhance symmetrically.  No urolithiasis is seen. No hydroureteronephrosis is seen.   The visualized aorta is unremarkable.   The small bowel is not dilated. The appendix is normal. There is diffuse colonic diverticulosis without evidence of diverticulitis.   No free intraperitoneal air or fluid is seen.   The bladder is well distended with no gross wall thickening.   The visualized osseous structures are intact.   Limited images of the lower thorax are unremarkable.       No acute abdominopelvic pathology. Status post cholecystectomy. Pneumobilia which is noted likely related to for sphincterotomy/instrumentation. Small hiatal hernia. There is diffuse colonic diverticulosis without evidence of diverticulitis.   MACRO: None   Signed by: Osiris Steward 11/29/2023 5:30 PM Dictation workstation:   [id number]    XR chest 1 view    Result Date: 11/29/2023  Interpreted By:  Wilman Barcenas, STUDY: XR CHEST 1 VIEW; 11/29/2023 2:35 pm   INDICATION: Signs/Symptoms:food impaction.   COMPARISON: 08/25/2023   ACCESSION NUMBER(S): [id number]   ORDERING CLINICIAN: KEYONNA YODER   TECHNIQUE: 1 view of the chest was performed.   FINDINGS: The lungs are adequately inflated. No acute consolidation. Minimal left basilar opacity likely atelectasis. No pleural effusion. No pneumothorax.  The cardiomediastinal silhouette is within normal limits. Degenerative changes of the shoulder joints.       No acute cardiopulmonary disease.   Signed by: Wilman Barcenas 11/29/2023 4:01 PM Dictation workstation:   [id number]     Assessment/Plan   Right leg  cellulitis  Right leg wound  Leukocytosis      IV rocephin  IV vancomycin  Right leg culture  Local care  Monitor temp and wbc    Okay to switch to oral doxycyline BID and augmentin BID for 14 days  Follow up with PCP and or wound care  Follow up wound culture-can be done outpatient      Mary Sanchez, APRN-CNP

## 2023-11-30 NOTE — DISCHARGE SUMMARY
Discharge Diagnosis  Nausea & vomiting  Problem   Nausea & Vomiting (Resolved)         Issues Requiring Follow-Up  Follow up with your PCP Tegan Giordano MD, within 1 week     Imaging results   CT abdomen pelvis w IV contrast    Result Date: 11/29/2023  Interpreted By:  Osiris Steward, STUDY: CT ABDOMEN PELVIS W IV CONTRAST; 11/29/2023 5:15 pm   INDICATION: Signs/Symptoms:epigastric pain / vomiting;   COMPARISON: None   ACCESSION NUMBER(S): [id number]   ORDERING CLINICIAN: KEYONNA YODER   TECHNIQUE: Contiguous axial images of the abdomen/pelvis were performed with IV contrast. 75 ml of Omnipaque 350 was utilized. All CT examinations are performed with 1 or more of the following dose reduction techniques: Automated exposure control, adjustment of mA and/or kv according to patient's size, or use of iterative reconstruction techniques.   FINDINGS:   The liver,  common bile duct, pancreas, spleen, and adrenal glands are unremarkable. Pneumobilia is noted and may be related to with sphincterotomy/surgical intervention.   The kidneys enhance symmetrically.  No urolithiasis is seen. No hydroureteronephrosis is seen.   The visualized aorta is unremarkable.   The small bowel is not dilated. The appendix is normal. There is diffuse colonic diverticulosis without evidence of diverticulitis.   No free intraperitoneal air or fluid is seen.   The bladder is well distended with no gross wall thickening.   The visualized osseous structures are intact.   Limited images of the lower thorax are unremarkable.       No acute abdominopelvic pathology. Status post cholecystectomy. Pneumobilia which is noted likely related to for sphincterotomy/instrumentation. Small hiatal hernia. There is diffuse colonic diverticulosis without evidence of diverticulitis.   MACRO: None   Signed by: Osiris Steward 11/29/2023 5:30 PM Dictation workstation:   [id number]    XR chest 1 view    Result Date: 11/29/2023  Interpreted By:  Narinder  Wilman, STUDY: XR CHEST 1 VIEW; 11/29/2023 2:35 pm   INDICATION: Signs/Symptoms:food impaction.   COMPARISON: 08/25/2023   ACCESSION NUMBER(S): [id number]   ORDERING CLINICIAN: KEYONNA YODER   TECHNIQUE: 1 view of the chest was performed.   FINDINGS: The lungs are adequately inflated. No acute consolidation. Minimal left basilar opacity likely atelectasis. No pleural effusion. No pneumothorax.  The cardiomediastinal silhouette is within normal limits. Degenerative changes of the shoulder joints.       No acute cardiopulmonary disease.   Signed by: Wilman Barcenas 11/29/2023 4:01 PM Dictation workstation:   [id number]      Hospital Course  This is a 89 years old female who presented to emergency room with difficulty swallowing her pills.  Admitted with right lower extremity cellulitis and for evaluation of dysphagia.swallowing evaluated by GI, patient felt her pill was stuck in her throat now able to eat and drink liquids without any issues.  Evaluated by infection disease for right lower extremity cellulitis, patient will be discharged with oral antibiotic doxycycline and Augmentin for 14 days..  ID got specimen from her wound he will follow-up with the culture. Patient is taking Coumadin due to A-fib, today her INR is supratherapeutic at 3.9 we held Coumadin follow-up with INR to resume her Coumadin therapy.  Patient is hemodynamically stable no nausea vomiting or abdominal pain tolerates well her diet without any issues.    Physical exam  Physical Exam  General: alert, no diaphoresis   HENT: mucous membranes moist, external ears normal, no rhinorrhea   Eyes: no icterus or injection, no discharge   Lungs: CTA BL   Heart: RRR, no murmurs, no LE edema BL   GI: abdomen soft, nontender, nondistended, BS present   MSK: no joint effusion or deformity   Skin: no rashes, erythema, or ecchymosis   Neuro: grossly normal cognition, motor strength, sensation     Relevant Results    Lab Results   Component Value Date     WBC 19.2 (H) 11/30/2023    HGB 10.7 (L) 11/30/2023    HCT 31.5 (L) 11/30/2023    MCV 92 11/30/2023     11/30/2023     Lab Results   Component Value Date    GLUCOSE 144 (H) 11/30/2023    CALCIUM 8.3 (L) 11/30/2023     11/30/2023    K 4.0 11/30/2023    CO2 24 11/30/2023     11/30/2023    BUN 26 (H) 11/30/2023    CREATININE 0.80 11/30/2023     Lab Results   Component Value Date    INR 3.9 (H) 11/30/2023    INR 3.8 (H) 11/29/2023    INR 2.3 (H) 08/25/2023    PROTIME 38.2 (H) 11/30/2023    PROTIME 37.0 (H) 11/29/2023    PROTIME 23.4 (H) 08/25/2023            Medication List      START taking these medications     amoxicillin-pot clavulanate 500-125 mg tablet; Commonly known as:   Augmentin; Take 1 tablet (500 mg) by mouth every 12 hours for 14 days. Do   not start before December 1, 2023.; Start taking on: December 1, 2023   doxycycline 100 mg capsule; Commonly known as: Vibramycin; Take 1   capsule (100 mg) by mouth every 12 hours for 14 days. Take with at least 8   ounces (large glass) of water, do not lie down for 30 minutes after     CONTINUE taking these medications     acetaminophen 325 mg tablet; Commonly known as: Tylenol   amLODIPine 2.5 mg tablet; Commonly known as: Norvasc   chlorthalidone 25 mg tablet; Commonly known as: Hygroton   colestipol 1 gram tablet; Commonly known as: Colestid   denosumab 60 mg/mL syringe; Commonly known as: Prolia   gabapentin 100 mg capsule; Commonly known as: Neurontin   latanoprost 0.005 % drops, emulsion   levothyroxine 100 mcg tablet; Commonly known as: Synthroid, Levoxyl   metFORMIN 500 mg tablet; Commonly known as: Glucophage   metoprolol succinate XL 25 mg 24 hr tablet; Commonly known as: Toprol-XL   potassium chloride CR 10 mEq ER tablet; Commonly known as: Klor-Con   predniSONE 5 mg tablet; Commonly known as: Deltasone     STOP taking these medications     warfarin 10 mg tablet; Commonly known as: Coumadin   warfarin 5 mg tablet; Commonly known as:  Coumadin       Outpatient Follow-Up  No future appointments.      Time overall spent on discharge summary 40 minutes    Rea Betancur, APRN-CNP

## 2023-12-01 ENCOUNTER — APPOINTMENT (OUTPATIENT)
Dept: RADIOLOGY | Facility: HOSPITAL | Age: 88
End: 2023-12-01
Payer: MEDICARE

## 2023-12-01 ENCOUNTER — HOSPITAL ENCOUNTER (OUTPATIENT)
Facility: HOSPITAL | Age: 88
Setting detail: OBSERVATION
Discharge: NURSING, REHAB OR LONG-TERM CARE | End: 2023-12-01
Attending: HOSPITALIST | Admitting: HOSPITALIST
Payer: MEDICARE

## 2023-12-01 ENCOUNTER — PHARMACY VISIT (OUTPATIENT)
Dept: PHARMACY | Facility: CLINIC | Age: 88
End: 2023-12-01
Payer: MEDICARE

## 2023-12-01 VITALS
HEIGHT: 62 IN | HEART RATE: 98 BPM | WEIGHT: 151.68 LBS | BODY MASS INDEX: 27.91 KG/M2 | DIASTOLIC BLOOD PRESSURE: 72 MMHG | OXYGEN SATURATION: 96 % | SYSTOLIC BLOOD PRESSURE: 144 MMHG | TEMPERATURE: 98.4 F | RESPIRATION RATE: 17 BRPM
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
## 2023-12-01 DIAGNOSIS — I48.11 LONGSTANDING PERSISTENT ATRIAL FIBRILLATION (MULTI): ICD-10-CM

## 2023-12-01 DIAGNOSIS — K59.01 SLOW TRANSIT CONSTIPATION: ICD-10-CM

## 2023-12-01 DIAGNOSIS — M25.562 ACUTE PAIN OF LEFT KNEE: ICD-10-CM

## 2023-12-01 DIAGNOSIS — N17.9 AKI (ACUTE KIDNEY INJURY) (CMS-HCC): ICD-10-CM

## 2023-12-01 DIAGNOSIS — S89.92XA INJURY OF LEFT KNEE, INITIAL ENCOUNTER: Primary | ICD-10-CM

## 2023-12-01 PROBLEM — M25.569 KNEE PAIN: Status: ACTIVE | Noted: 2023-12-01

## 2023-12-01 LAB
ANION GAP SERPL CALC-SCNC: 17 MMOL/L
BUN SERPL-MCNC: 38 MG/DL (ref 8–25)
CALCIUM SERPL-MCNC: 8.5 MG/DL (ref 8.5–10.4)
CHLORIDE SERPL-SCNC: 101 MMOL/L (ref 97–107)
CO2 SERPL-SCNC: 17 MMOL/L (ref 24–31)
CREAT SERPL-MCNC: 1.5 MG/DL (ref 0.4–1.6)
ERYTHROCYTE [DISTWIDTH] IN BLOOD BY AUTOMATED COUNT: 13.9 % (ref 11.5–14.5)
GFR SERPL CREATININE-BSD FRML MDRD: 32 ML/MIN/1.73M*2
GLUCOSE SERPL-MCNC: 144 MG/DL (ref 65–99)
HCT VFR BLD AUTO: 33.1 % (ref 36–46)
HGB BLD-MCNC: 10.7 G/DL (ref 12–16)
INR PPP: 3.5 (ref 0.9–1.2)
MCH RBC QN AUTO: 31.4 PG (ref 26–34)
MCHC RBC AUTO-ENTMCNC: 32.3 G/DL (ref 32–36)
MCV RBC AUTO: 97 FL (ref 80–100)
NRBC BLD-RTO: 0 /100 WBCS (ref 0–0)
PLATELET # BLD AUTO: 256 X10*3/UL (ref 150–450)
POTASSIUM SERPL-SCNC: 4.7 MMOL/L (ref 3.4–5.1)
PROTHROMBIN TIME: 34.1 SECONDS (ref 9.3–12.7)
RBC # BLD AUTO: 3.41 X10*6/UL (ref 4–5.2)
SODIUM SERPL-SCNC: 135 MMOL/L (ref 133–145)
WBC # BLD AUTO: 14.7 X10*3/UL (ref 4.4–11.3)

## 2023-12-01 PROCEDURE — 72125 CT NECK SPINE W/O DYE: CPT

## 2023-12-01 PROCEDURE — 80048 BASIC METABOLIC PNL TOTAL CA: CPT | Performed by: NURSE PRACTITIONER

## 2023-12-01 PROCEDURE — 2500000001 HC RX 250 WO HCPCS SELF ADMINISTERED DRUGS (ALT 637 FOR MEDICARE OP): Performed by: NURSE PRACTITIONER

## 2023-12-01 PROCEDURE — 85027 COMPLETE CBC AUTOMATED: CPT | Performed by: NURSE PRACTITIONER

## 2023-12-01 PROCEDURE — 94760 N-INVAS EAR/PLS OXIMETRY 1: CPT

## 2023-12-01 PROCEDURE — 99285 EMERGENCY DEPT VISIT HI MDM: CPT

## 2023-12-01 PROCEDURE — G0378 HOSPITAL OBSERVATION PER HR: HCPCS

## 2023-12-01 PROCEDURE — 85610 PROTHROMBIN TIME: CPT | Performed by: NURSE PRACTITIONER

## 2023-12-01 PROCEDURE — 73560 X-RAY EXAM OF KNEE 1 OR 2: CPT | Mod: LT,FY

## 2023-12-01 PROCEDURE — 36415 COLL VENOUS BLD VENIPUNCTURE: CPT | Performed by: NURSE PRACTITIONER

## 2023-12-01 PROCEDURE — 70450 CT HEAD/BRAIN W/O DYE: CPT

## 2023-12-01 RX ORDER — ONDANSETRON 4 MG/1
4 TABLET, ORALLY DISINTEGRATING ORAL EVERY 8 HOURS PRN
Status: DISCONTINUED | OUTPATIENT
Start: 2023-12-01 | End: 2023-12-01 | Stop reason: SDUPTHER

## 2023-12-01 RX ORDER — METFORMIN HYDROCHLORIDE 500 MG/1
500 TABLET ORAL
Status: DISCONTINUED | OUTPATIENT
Start: 2023-12-02 | End: 2023-12-01 | Stop reason: HOSPADM

## 2023-12-01 RX ORDER — ACETAMINOPHEN 500 MG
1000 TABLET ORAL 2 TIMES DAILY PRN
Status: DISCONTINUED | OUTPATIENT
Start: 2023-12-01 | End: 2023-12-01 | Stop reason: HOSPADM

## 2023-12-01 RX ORDER — PROCHLORPERAZINE MALEATE 10 MG
10 TABLET ORAL EVERY 6 HOURS PRN
Status: DISCONTINUED | OUTPATIENT
Start: 2023-12-01 | End: 2023-12-01 | Stop reason: HOSPADM

## 2023-12-01 RX ORDER — PROCHLORPERAZINE EDISYLATE 5 MG/ML
10 INJECTION INTRAMUSCULAR; INTRAVENOUS EVERY 6 HOURS PRN
Status: CANCELLED | OUTPATIENT
Start: 2023-12-01

## 2023-12-01 RX ORDER — ONDANSETRON 4 MG/1
4 TABLET, FILM COATED ORAL EVERY 8 HOURS PRN
Status: CANCELLED | OUTPATIENT
Start: 2023-12-01

## 2023-12-01 RX ORDER — ACETAMINOPHEN 650 MG/1
650 SUPPOSITORY RECTAL EVERY 4 HOURS PRN
Status: DISCONTINUED | OUTPATIENT
Start: 2023-12-01 | End: 2023-12-01 | Stop reason: HOSPADM

## 2023-12-01 RX ORDER — LEVOTHYROXINE SODIUM 100 UG/1
100 TABLET ORAL
Status: DISCONTINUED | OUTPATIENT
Start: 2023-12-02 | End: 2023-12-01

## 2023-12-01 RX ORDER — POTASSIUM CHLORIDE 750 MG/1
10 TABLET, FILM COATED, EXTENDED RELEASE ORAL DAILY
Status: DISCONTINUED | OUTPATIENT
Start: 2023-12-01 | End: 2023-12-01

## 2023-12-01 RX ORDER — PROCHLORPERAZINE 25 MG/1
25 SUPPOSITORY RECTAL EVERY 12 HOURS PRN
Status: DISCONTINUED | OUTPATIENT
Start: 2023-12-01 | End: 2023-12-01 | Stop reason: HOSPADM

## 2023-12-01 RX ORDER — PROCHLORPERAZINE EDISYLATE 5 MG/ML
10 INJECTION INTRAMUSCULAR; INTRAVENOUS EVERY 6 HOURS PRN
Status: DISCONTINUED | OUTPATIENT
Start: 2023-12-01 | End: 2023-12-01 | Stop reason: HOSPADM

## 2023-12-01 RX ORDER — ONDANSETRON HYDROCHLORIDE 2 MG/ML
4 INJECTION, SOLUTION INTRAVENOUS EVERY 8 HOURS PRN
Status: DISCONTINUED | OUTPATIENT
Start: 2023-12-01 | End: 2023-12-01 | Stop reason: HOSPADM

## 2023-12-01 RX ORDER — PROCHLORPERAZINE MALEATE 10 MG
10 TABLET ORAL EVERY 6 HOURS PRN
Status: CANCELLED | OUTPATIENT
Start: 2023-12-01

## 2023-12-01 RX ORDER — POTASSIUM CHLORIDE 750 MG/1
10 TABLET, FILM COATED, EXTENDED RELEASE ORAL DAILY
Status: DISCONTINUED | OUTPATIENT
Start: 2023-12-02 | End: 2023-12-01 | Stop reason: HOSPADM

## 2023-12-01 RX ORDER — BISACODYL 5 MG
10 TABLET, DELAYED RELEASE (ENTERIC COATED) ORAL DAILY PRN
Status: CANCELLED | OUTPATIENT
Start: 2023-12-01

## 2023-12-01 RX ORDER — MONTELUKAST SODIUM 4 MG/1
2 TABLET, CHEWABLE ORAL 2 TIMES DAILY
Status: DISCONTINUED | OUTPATIENT
Start: 2023-12-01 | End: 2023-12-01

## 2023-12-01 RX ORDER — GABAPENTIN 100 MG/1
100 CAPSULE ORAL 3 TIMES DAILY
Qty: 9 CAPSULE | Refills: 0 | Status: SHIPPED | OUTPATIENT
Start: 2023-12-01 | End: 2023-12-04

## 2023-12-01 RX ORDER — POLYETHYLENE GLYCOL 3350 17 G/17G
17 POWDER, FOR SOLUTION ORAL DAILY PRN
Status: DISCONTINUED | OUTPATIENT
Start: 2023-12-01 | End: 2023-12-01 | Stop reason: HOSPADM

## 2023-12-01 RX ORDER — HYDROCODONE BITARTRATE AND ACETAMINOPHEN 5; 325 MG/1; MG/1
1 TABLET ORAL EVERY 6 HOURS PRN
Qty: 20 TABLET | Refills: 0 | Status: SHIPPED
Start: 2023-12-01 | End: 2023-12-01 | Stop reason: SDUPTHER

## 2023-12-01 RX ORDER — ACETAMINOPHEN 325 MG/1
650 TABLET ORAL EVERY 4 HOURS PRN
Status: CANCELLED | OUTPATIENT
Start: 2023-12-01

## 2023-12-01 RX ORDER — HEPARIN SODIUM 5000 [USP'U]/ML
5000 INJECTION, SOLUTION INTRAVENOUS; SUBCUTANEOUS 2 TIMES DAILY
Status: CANCELLED | OUTPATIENT
Start: 2023-12-01

## 2023-12-01 RX ORDER — TALC
3 POWDER (GRAM) TOPICAL DAILY
Status: DISCONTINUED | OUTPATIENT
Start: 2023-12-01 | End: 2023-12-01

## 2023-12-01 RX ORDER — AMLODIPINE BESYLATE 5 MG/1
2.5 TABLET ORAL DAILY
Status: DISCONTINUED | OUTPATIENT
Start: 2023-12-01 | End: 2023-12-01

## 2023-12-01 RX ORDER — PREDNISONE 10 MG/1
5 TABLET ORAL DAILY
Status: DISCONTINUED | OUTPATIENT
Start: 2023-12-01 | End: 2023-12-01

## 2023-12-01 RX ORDER — ACETAMINOPHEN 650 MG/1
650 SUPPOSITORY RECTAL EVERY 4 HOURS PRN
Status: DISCONTINUED | OUTPATIENT
Start: 2023-12-01 | End: 2023-12-01 | Stop reason: SDUPTHER

## 2023-12-01 RX ORDER — POLYETHYLENE GLYCOL 3350 17 G/17G
17 POWDER, FOR SOLUTION ORAL AS NEEDED
Status: CANCELLED | OUTPATIENT
Start: 2023-12-01

## 2023-12-01 RX ORDER — BISACODYL 5 MG
10 TABLET, DELAYED RELEASE (ENTERIC COATED) ORAL DAILY PRN
Status: DISCONTINUED | OUTPATIENT
Start: 2023-12-01 | End: 2023-12-01 | Stop reason: HOSPADM

## 2023-12-01 RX ORDER — CHLORTHALIDONE 25 MG/1
25 TABLET ORAL
Status: DISCONTINUED | OUTPATIENT
Start: 2023-12-01 | End: 2023-12-01

## 2023-12-01 RX ORDER — POLYETHYLENE GLYCOL 3350 17 G/17G
17 POWDER, FOR SOLUTION ORAL AS NEEDED
Status: DISCONTINUED | OUTPATIENT
Start: 2023-12-01 | End: 2023-12-01 | Stop reason: SDUPTHER

## 2023-12-01 RX ORDER — ACETAMINOPHEN 160 MG/5ML
650 SOLUTION ORAL EVERY 4 HOURS PRN
Status: DISCONTINUED | OUTPATIENT
Start: 2023-12-01 | End: 2023-12-01 | Stop reason: SDUPTHER

## 2023-12-01 RX ORDER — GABAPENTIN 100 MG/1
100 CAPSULE ORAL 3 TIMES DAILY
Status: DISCONTINUED | OUTPATIENT
Start: 2023-12-01 | End: 2023-12-01

## 2023-12-01 RX ORDER — GABAPENTIN 100 MG/1
100 CAPSULE ORAL 3 TIMES DAILY
Qty: 9 CAPSULE | Refills: 0 | Status: SHIPPED | OUTPATIENT
Start: 2023-12-01 | End: 2023-12-01 | Stop reason: SDUPTHER

## 2023-12-01 RX ORDER — PANTOPRAZOLE SODIUM 40 MG/10ML
40 INJECTION, POWDER, LYOPHILIZED, FOR SOLUTION INTRAVENOUS 2 TIMES DAILY
Status: DISCONTINUED | OUTPATIENT
Start: 2023-12-01 | End: 2023-12-01 | Stop reason: HOSPADM

## 2023-12-01 RX ORDER — AMLODIPINE BESYLATE 5 MG/1
2.5 TABLET ORAL DAILY
Status: DISCONTINUED | OUTPATIENT
Start: 2023-12-02 | End: 2023-12-01 | Stop reason: HOSPADM

## 2023-12-01 RX ORDER — ONDANSETRON HYDROCHLORIDE 2 MG/ML
4 INJECTION, SOLUTION INTRAVENOUS EVERY 8 HOURS PRN
Status: DISCONTINUED | OUTPATIENT
Start: 2023-12-01 | End: 2023-12-01 | Stop reason: SDUPTHER

## 2023-12-01 RX ORDER — ACETAMINOPHEN 160 MG/5ML
650 SOLUTION ORAL EVERY 4 HOURS PRN
Status: CANCELLED | OUTPATIENT
Start: 2023-12-01

## 2023-12-01 RX ORDER — HYDROCODONE BITARTRATE AND ACETAMINOPHEN 5; 325 MG/1; MG/1
1 TABLET ORAL ONCE
Status: COMPLETED | OUTPATIENT
Start: 2023-12-01 | End: 2023-12-01

## 2023-12-01 RX ORDER — METOPROLOL SUCCINATE 25 MG/1
25 TABLET, EXTENDED RELEASE ORAL DAILY
Status: DISCONTINUED | OUTPATIENT
Start: 2023-12-02 | End: 2023-12-01 | Stop reason: HOSPADM

## 2023-12-01 RX ORDER — HEPARIN SODIUM 5000 [USP'U]/ML
5000 INJECTION, SOLUTION INTRAVENOUS; SUBCUTANEOUS 2 TIMES DAILY
Status: DISCONTINUED | OUTPATIENT
Start: 2023-12-01 | End: 2023-12-01 | Stop reason: HOSPADM

## 2023-12-01 RX ORDER — TALC
3 POWDER (GRAM) TOPICAL DAILY
Status: DISCONTINUED | OUTPATIENT
Start: 2023-12-01 | End: 2023-12-01 | Stop reason: HOSPADM

## 2023-12-01 RX ORDER — POLYETHYLENE GLYCOL 3350 17 G/17G
17 POWDER, FOR SOLUTION ORAL DAILY
Start: 2023-12-01

## 2023-12-01 RX ORDER — GUAIFENESIN/DEXTROMETHORPHAN 100-10MG/5
5 SYRUP ORAL EVERY 4 HOURS PRN
Status: CANCELLED | OUTPATIENT
Start: 2023-12-01

## 2023-12-01 RX ORDER — ACETAMINOPHEN 160 MG/5ML
650 SOLUTION ORAL EVERY 4 HOURS PRN
Status: DISCONTINUED | OUTPATIENT
Start: 2023-12-01 | End: 2023-12-01 | Stop reason: HOSPADM

## 2023-12-01 RX ORDER — DOXYCYCLINE 100 MG/1
100 CAPSULE ORAL EVERY 12 HOURS SCHEDULED
Status: DISCONTINUED | OUTPATIENT
Start: 2023-12-01 | End: 2023-12-01

## 2023-12-01 RX ORDER — GABAPENTIN 100 MG/1
100 CAPSULE ORAL 3 TIMES DAILY
Status: DISCONTINUED | OUTPATIENT
Start: 2023-12-01 | End: 2023-12-01 | Stop reason: HOSPADM

## 2023-12-01 RX ORDER — ACETAMINOPHEN 325 MG/1
650 TABLET ORAL EVERY 4 HOURS PRN
Status: DISCONTINUED | OUTPATIENT
Start: 2023-12-01 | End: 2023-12-01 | Stop reason: HOSPADM

## 2023-12-01 RX ORDER — DOXYCYCLINE 100 MG/1
100 CAPSULE ORAL EVERY 12 HOURS SCHEDULED
Status: DISCONTINUED | OUTPATIENT
Start: 2023-12-01 | End: 2023-12-01 | Stop reason: HOSPADM

## 2023-12-01 RX ORDER — ACETAMINOPHEN 325 MG/1
650 TABLET ORAL EVERY 4 HOURS PRN
Status: DISCONTINUED | OUTPATIENT
Start: 2023-12-01 | End: 2023-12-01 | Stop reason: SDUPTHER

## 2023-12-01 RX ORDER — MONTELUKAST SODIUM 4 MG/1
2 TABLET, CHEWABLE ORAL 2 TIMES DAILY
Status: DISCONTINUED | OUTPATIENT
Start: 2023-12-01 | End: 2023-12-01 | Stop reason: HOSPADM

## 2023-12-01 RX ORDER — ONDANSETRON HYDROCHLORIDE 2 MG/ML
4 INJECTION, SOLUTION INTRAVENOUS EVERY 8 HOURS PRN
Status: CANCELLED | OUTPATIENT
Start: 2023-12-01

## 2023-12-01 RX ORDER — METOPROLOL SUCCINATE 25 MG/1
25 TABLET, EXTENDED RELEASE ORAL DAILY
Status: DISCONTINUED | OUTPATIENT
Start: 2023-12-01 | End: 2023-12-01

## 2023-12-01 RX ORDER — HYDROCODONE BITARTRATE AND ACETAMINOPHEN 5; 325 MG/1; MG/1
1 TABLET ORAL EVERY 6 HOURS PRN
Status: DISCONTINUED | OUTPATIENT
Start: 2023-12-01 | End: 2023-12-01 | Stop reason: HOSPADM

## 2023-12-01 RX ORDER — LEVOTHYROXINE SODIUM 100 UG/1
100 TABLET ORAL
Status: DISCONTINUED | OUTPATIENT
Start: 2023-12-02 | End: 2023-12-01 | Stop reason: HOSPADM

## 2023-12-01 RX ORDER — GUAIFENESIN/DEXTROMETHORPHAN 100-10MG/5
5 SYRUP ORAL EVERY 4 HOURS PRN
Status: DISCONTINUED | OUTPATIENT
Start: 2023-12-01 | End: 2023-12-01 | Stop reason: HOSPADM

## 2023-12-01 RX ORDER — ACETAMINOPHEN 650 MG/1
650 SUPPOSITORY RECTAL EVERY 4 HOURS PRN
Status: CANCELLED | OUTPATIENT
Start: 2023-12-01

## 2023-12-01 RX ORDER — AMOXICILLIN AND CLAVULANATE POTASSIUM 500; 125 MG/1; MG/1
500 TABLET, FILM COATED ORAL EVERY 12 HOURS SCHEDULED
Status: DISCONTINUED | OUTPATIENT
Start: 2023-12-01 | End: 2023-12-01 | Stop reason: HOSPADM

## 2023-12-01 RX ORDER — HYDROCODONE BITARTRATE AND ACETAMINOPHEN 5; 325 MG/1; MG/1
1 TABLET ORAL EVERY 6 HOURS PRN
Qty: 20 TABLET | Refills: 0 | Status: SHIPPED | OUTPATIENT
Start: 2023-12-01 | End: 2023-12-04

## 2023-12-01 RX ORDER — TALC
3 POWDER (GRAM) TOPICAL DAILY
Status: CANCELLED | OUTPATIENT
Start: 2023-12-01

## 2023-12-01 RX ORDER — SODIUM CHLORIDE 9 MG/ML
125 INJECTION, SOLUTION INTRAVENOUS CONTINUOUS
Status: DISCONTINUED | OUTPATIENT
Start: 2023-12-01 | End: 2023-12-01 | Stop reason: HOSPADM

## 2023-12-01 RX ORDER — LATANOPROST 50 UG/ML
1 SOLUTION/ DROPS OPHTHALMIC NIGHTLY
Status: DISCONTINUED | OUTPATIENT
Start: 2023-12-01 | End: 2023-12-01 | Stop reason: HOSPADM

## 2023-12-01 RX ORDER — AMOXICILLIN AND CLAVULANATE POTASSIUM 500; 125 MG/1; MG/1
500 TABLET, FILM COATED ORAL EVERY 12 HOURS SCHEDULED
Status: DISCONTINUED | OUTPATIENT
Start: 2023-12-01 | End: 2023-12-01

## 2023-12-01 RX ORDER — PREDNISONE 10 MG/1
5 TABLET ORAL DAILY
Status: DISCONTINUED | OUTPATIENT
Start: 2023-12-02 | End: 2023-12-01 | Stop reason: HOSPADM

## 2023-12-01 RX ORDER — ONDANSETRON 4 MG/1
4 TABLET, FILM COATED ORAL EVERY 8 HOURS PRN
Status: DISCONTINUED | OUTPATIENT
Start: 2023-12-01 | End: 2023-12-01 | Stop reason: HOSPADM

## 2023-12-01 RX ORDER — PROCHLORPERAZINE 25 MG/1
25 SUPPOSITORY RECTAL EVERY 12 HOURS PRN
Status: CANCELLED | OUTPATIENT
Start: 2023-12-01

## 2023-12-01 RX ORDER — HYDRALAZINE HYDROCHLORIDE 20 MG/ML
10 INJECTION INTRAMUSCULAR; INTRAVENOUS EVERY 4 HOURS PRN
Status: DISCONTINUED | OUTPATIENT
Start: 2023-12-01 | End: 2023-12-01 | Stop reason: HOSPADM

## 2023-12-01 RX ADMIN — HYDROCODONE BITARTRATE AND ACETAMINOPHEN 1 TABLET: 5; 325 TABLET ORAL at 10:06

## 2023-12-01 SDOH — ECONOMIC STABILITY: FOOD INSECURITY: WITHIN THE PAST 12 MONTHS, THE FOOD YOU BOUGHT JUST DIDN'T LAST AND YOU DIDN'T HAVE MONEY TO GET MORE.: NEVER TRUE

## 2023-12-01 SDOH — SOCIAL STABILITY: SOCIAL INSECURITY
WITHIN THE LAST YEAR, HAVE TO BEEN RAPED OR FORCED TO HAVE ANY KIND OF SEXUAL ACTIVITY BY YOUR PARTNER OR EX-PARTNER?: NO

## 2023-12-01 SDOH — SOCIAL STABILITY: SOCIAL NETWORK: ARE YOU MARRIED, WIDOWED, DIVORCED, SEPARATED, NEVER MARRIED, OR LIVING WITH A PARTNER?: DIVORCED

## 2023-12-01 SDOH — ECONOMIC STABILITY: TRANSPORTATION INSECURITY
IN THE PAST 12 MONTHS, HAS THE LACK OF TRANSPORTATION KEPT YOU FROM MEDICAL APPOINTMENTS OR FROM GETTING MEDICATIONS?: NO

## 2023-12-01 SDOH — SOCIAL STABILITY: SOCIAL NETWORK
DO YOU BELONG TO ANY CLUBS OR ORGANIZATIONS SUCH AS CHURCH GROUPS UNIONS, FRATERNAL OR ATHLETIC GROUPS, OR SCHOOL GROUPS?: NO

## 2023-12-01 SDOH — ECONOMIC STABILITY: INCOME INSECURITY: HOW HARD IS IT FOR YOU TO PAY FOR THE VERY BASICS LIKE FOOD, HOUSING, MEDICAL CARE, AND HEATING?: NOT HARD AT ALL

## 2023-12-01 SDOH — HEALTH STABILITY: MENTAL HEALTH: HOW OFTEN DO YOU HAVE 6 OR MORE DRINKS ON ONE OCCASION?: NEVER

## 2023-12-01 SDOH — HEALTH STABILITY: MENTAL HEALTH: HOW OFTEN DO YOU HAVE A DRINK CONTAINING ALCOHOL?: NEVER

## 2023-12-01 SDOH — ECONOMIC STABILITY: HOUSING INSECURITY
IN THE LAST 12 MONTHS, WAS THERE A TIME WHEN YOU DID NOT HAVE A STEADY PLACE TO SLEEP OR SLEPT IN A SHELTER (INCLUDING NOW)?: NO

## 2023-12-01 SDOH — SOCIAL STABILITY: SOCIAL INSECURITY
WITHIN THE LAST YEAR, HAVE YOU BEEN KICKED, HIT, SLAPPED, OR OTHERWISE PHYSICALLY HURT BY YOUR PARTNER OR EX-PARTNER?: NO

## 2023-12-01 SDOH — SOCIAL STABILITY: SOCIAL INSECURITY: WITHIN THE LAST YEAR, HAVE YOU BEEN HUMILIATED OR EMOTIONALLY ABUSED IN OTHER WAYS BY YOUR PARTNER OR EX-PARTNER?: NO

## 2023-12-01 SDOH — ECONOMIC STABILITY: HOUSING INSECURITY: IN THE LAST 12 MONTHS, HOW MANY PLACES HAVE YOU LIVED?: 1

## 2023-12-01 SDOH — ECONOMIC STABILITY: TRANSPORTATION INSECURITY
IN THE PAST 12 MONTHS, HAS LACK OF TRANSPORTATION KEPT YOU FROM MEETINGS, WORK, OR FROM GETTING THINGS NEEDED FOR DAILY LIVING?: NO

## 2023-12-01 SDOH — HEALTH STABILITY: MENTAL HEALTH
STRESS IS WHEN SOMEONE FEELS TENSE, NERVOUS, ANXIOUS, OR CAN'T SLEEP AT NIGHT BECAUSE THEIR MIND IS TROUBLED. HOW STRESSED ARE YOU?: NOT AT ALL

## 2023-12-01 SDOH — ECONOMIC STABILITY: INCOME INSECURITY: IN THE PAST 12 MONTHS, HAS THE ELECTRIC, GAS, OIL, OR WATER COMPANY THREATENED TO SHUT OFF SERVICE IN YOUR HOME?: NO

## 2023-12-01 SDOH — ECONOMIC STABILITY: INCOME INSECURITY: IN THE LAST 12 MONTHS, WAS THERE A TIME WHEN YOU WERE NOT ABLE TO PAY THE MORTGAGE OR RENT ON TIME?: NO

## 2023-12-01 SDOH — SOCIAL STABILITY: SOCIAL INSECURITY: WITHIN THE LAST YEAR, HAVE YOU BEEN AFRAID OF YOUR PARTNER OR EX-PARTNER?: NO

## 2023-12-01 SDOH — ECONOMIC STABILITY: FOOD INSECURITY: WITHIN THE PAST 12 MONTHS, YOU WORRIED THAT YOUR FOOD WOULD RUN OUT BEFORE YOU GOT MONEY TO BUY MORE.: NEVER TRUE

## 2023-12-01 SDOH — SOCIAL STABILITY: SOCIAL NETWORK
IN A TYPICAL WEEK, HOW MANY TIMES DO YOU TALK ON THE PHONE WITH FAMILY, FRIENDS, OR NEIGHBORS?: MORE THAN THREE TIMES A WEEK

## 2023-12-01 SDOH — HEALTH STABILITY: PHYSICAL HEALTH: ON AVERAGE, HOW MANY DAYS PER WEEK DO YOU ENGAGE IN MODERATE TO STRENUOUS EXERCISE (LIKE A BRISK WALK)?: 0 DAYS

## 2023-12-01 SDOH — SOCIAL STABILITY: SOCIAL NETWORK: HOW OFTEN DO YOU ATTEND CHURCH OR RELIGIOUS SERVICES?: NEVER

## 2023-12-01 SDOH — HEALTH STABILITY: PHYSICAL HEALTH: ON AVERAGE, HOW MANY MINUTES DO YOU ENGAGE IN EXERCISE AT THIS LEVEL?: 0 MIN

## 2023-12-01 SDOH — HEALTH STABILITY: MENTAL HEALTH: HOW MANY STANDARD DRINKS CONTAINING ALCOHOL DO YOU HAVE ON A TYPICAL DAY?: PATIENT DOES NOT DRINK

## 2023-12-01 SDOH — SOCIAL STABILITY: SOCIAL NETWORK: HOW OFTEN DO YOU GET TOGETHER WITH FRIENDS OR RELATIVES?: MORE THAN THREE TIMES A WEEK

## 2023-12-01 SDOH — SOCIAL STABILITY: SOCIAL NETWORK: HOW OFTEN DO YOU ATTENT MEETINGS OF THE CLUB OR ORGANIZATION YOU BELONG TO?: NEVER

## 2023-12-01 ASSESSMENT — PAIN SCALES - WONG BAKER: WONGBAKER_NUMERICALRESPONSE: NO HURT

## 2023-12-01 ASSESSMENT — ACTIVITIES OF DAILY LIVING (ADL): LACK_OF_TRANSPORTATION: NO

## 2023-12-01 ASSESSMENT — PAIN - FUNCTIONAL ASSESSMENT
PAIN_FUNCTIONAL_ASSESSMENT: 0-10

## 2023-12-01 ASSESSMENT — PAIN SCALES - PAIN ASSESSMENT IN ADVANCED DEMENTIA (PAINAD): BREATHING: NORMAL

## 2023-12-01 ASSESSMENT — COLUMBIA-SUICIDE SEVERITY RATING SCALE - C-SSRS
6. HAVE YOU EVER DONE ANYTHING, STARTED TO DO ANYTHING, OR PREPARED TO DO ANYTHING TO END YOUR LIFE?: NO
1. IN THE PAST MONTH, HAVE YOU WISHED YOU WERE DEAD OR WISHED YOU COULD GO TO SLEEP AND NOT WAKE UP?: NO
2. HAVE YOU ACTUALLY HAD ANY THOUGHTS OF KILLING YOURSELF?: NO

## 2023-12-01 ASSESSMENT — PAIN SCALES - GENERAL
PAINLEVEL_OUTOF10: 0 - NO PAIN

## 2023-12-01 ASSESSMENT — LIFESTYLE VARIABLES
AUDIT-C TOTAL SCORE: 0
HAVE YOU EVER FELT YOU SHOULD CUT DOWN ON YOUR DRINKING: NO
SKIP TO QUESTIONS 9-10: 1
REASON UNABLE TO ASSESS: NO
EVER FELT BAD OR GUILTY ABOUT YOUR DRINKING: NO
EVER HAD A DRINK FIRST THING IN THE MORNING TO STEADY YOUR NERVES TO GET RID OF A HANGOVER: NO
HAVE PEOPLE ANNOYED YOU BY CRITICIZING YOUR DRINKING: NO

## 2023-12-01 ASSESSMENT — PAIN DESCRIPTION - PROGRESSION
CLINICAL_PROGRESSION: NOT CHANGED
CLINICAL_PROGRESSION: NOT CHANGED

## 2023-12-01 NOTE — NURSING NOTE
1650   Pt arrived to Rm 439 B from ED.  VS taken by PCA. Pt made comfortable. RN started doing admission when  called that this patient has been accepted at [facility].  to set up transportation for 630 PM.     [facility] will pick her up between 7 and 715 pm.    1855 Called report to [facility].

## 2023-12-01 NOTE — ED NOTES
Attempted to call report to floor nurse x2 no one is answering unit phone.      Miladys Germain RN  12/01/23 4449

## 2023-12-01 NOTE — DISCHARGE SUMMARY
Discharge Diagnosis  Knee pain  Problem   Fall   Atrial Fibrillation (Cms/Hcc) (Resolved)         Issues Requiring Follow-Up  Follow up with your PCP Tegan Giordano MD, within 1 week     Imaging results   XR knee left 1-2 views    Result Date: 12/1/2023  Interpreted By:  Berry Vergara, STUDY: XR KNEE LEFT 1-2 VIEWS; 12/1/2023 9:56 am   INDICATION: Signs/Symptoms:fall;   COMPARISON: None   ACCESSION NUMBER(S): [id number]   ORDERING CLINICIAN: PRINCE TITUS   TECHNIQUE: AP and lateral projection.   FINDINGS: The lateral projection is severely suboptimal.   No fracture or subluxation is identified. Chondrocalcinosis of the menisci can be seen. Severe patellofemoral osteoarthrosis is noted./pain   No suprapatellar joint effusion is identified.       No acute fracture or dislocation.   MACRO: None   Signed by: Berry Vergara 12/1/2023 9:59 AM Dictation workstation:   [id number]    CT cervical spine wo IV contrast    Result Date: 12/1/2023  Interpreted By:  Berry Vergara, STUDY: CT CERVICAL SPINE WO IV CONTRAST; 12/1/2023 9:20 am   INDICATION: Signs/Symptoms:fall;   COMPARISON: None   ACCESSION NUMBER(S): [id number]   ORDERING CLINICIAN: PRINCE TITUS   TECHNIQUE: Contiguous axial images of the cervical spine were performed. PATIENT RADIATION EXPOSURE DATA: CTDI: DLP:   All CT examinations are performed with one or more of the following dose reduction techniques: Automated Exposure Control, adjustment of mA and/or kV according to patient size, or use of iterative reconstruction techniques.   FINDINGS: Significant patient motion artifact is noted.   There is straightening of the normal cervical lordosis.   No acute fracture or spondylolisthesis is identified.   Multilevel disc space narrowing can be seen. Endplate osteophytosis facet arthropathy is noted.   Fusion of C3-C4 on the right is noted.   CPPD arthropathy of the dens is noted.   The neural foramina and spinal canal are grossly patent.    Bilateral TMJ osteoarthrosis is noted.   Calcification of the carotid bulbs can be seen bilaterally.   Limited visualization of the lung apices are unremarkable.       1. No acute fracture or spondylolisthesis. 2. Degenerative disc disease and spondylosis.   MACRO: None.   Signed by: Berry Vergara 12/1/2023 9:33 AM Dictation workstation:   [id number]    CT head wo IV contrast    Result Date: 12/1/2023  Interpreted By:  Berry Vergara, STUDY: CT HEAD WO IV CONTRAST; 12/1/2023 9:20 am   INDICATION: Signs/Symptoms:fall; /tripping injury   COMPARISON: None   ACCESSION NUMBER(S): [id number]   ORDERING CLINICIAN: PRINCE TITUS   TECHNIQUE: Contiguous axial images were acquired from the vertex through the posterior fossa without IV contrast.   All CT examinations are performed with one or more of the following dose reduction techniques: Automated Exposure Control, adjustment of mA and/or kV according to patient size, or use of iterative reconstruction techniques.   FINDINGS: No focal mass effect or midline shift is identified. The ventricles and sulci are symmetric and appropriate for the patient's age. However, chronic involutional change of the cerebral hemispheres is noted.   The gray white matter differentiation is preserved. Nonspecific hypodensities are identified within the periventricular and subcortical white matter likely due to chronic postischemic white matter disease. Atherosclerotic calcifications are seen within the carotid siphons and vertebral arteries.   No acute intracranial hemorrhage is identified. No intra-axial or extra-axial fluid collection is seen.   The visualized paranasal sinuses and mastoid air cells are clear.       No acute intracranial findings.   MACRO: None   Signed by: Brery Vergara 12/1/2023 9:30 AM Dictation workstation:   [id number]    CT abdomen pelvis w IV contrast    Result Date: 11/29/2023  Interpreted By:  Osiris Steward, STUDY: CT ABDOMEN PELVIS W IV CONTRAST;  11/29/2023 5:15 pm   INDICATION: Signs/Symptoms:epigastric pain / vomiting;   COMPARISON: None   ACCESSION NUMBER(S): [id number]   ORDERING CLINICIAN: KEYONNA YODER   TECHNIQUE: Contiguous axial images of the abdomen/pelvis were performed with IV contrast. 75 ml of Omnipaque 350 was utilized. All CT examinations are performed with 1 or more of the following dose reduction techniques: Automated exposure control, adjustment of mA and/or kv according to patient's size, or use of iterative reconstruction techniques.   FINDINGS:   The liver,  common bile duct, pancreas, spleen, and adrenal glands are unremarkable. Pneumobilia is noted and may be related to with sphincterotomy/surgical intervention.   The kidneys enhance symmetrically.  No urolithiasis is seen. No hydroureteronephrosis is seen.   The visualized aorta is unremarkable.   The small bowel is not dilated. The appendix is normal. There is diffuse colonic diverticulosis without evidence of diverticulitis.   No free intraperitoneal air or fluid is seen.   The bladder is well distended with no gross wall thickening.   The visualized osseous structures are intact.   Limited images of the lower thorax are unremarkable.       No acute abdominopelvic pathology. Status post cholecystectomy. Pneumobilia which is noted likely related to for sphincterotomy/instrumentation. Small hiatal hernia. There is diffuse colonic diverticulosis without evidence of diverticulitis.   MACRO: None   Signed by: Osiris Steward 11/29/2023 5:30 PM Dictation workstation:   [id number]    XR chest 1 view    Result Date: 11/29/2023  Interpreted By:  Wilman Barcenas, STUDY: XR CHEST 1 VIEW; 11/29/2023 2:35 pm   INDICATION: Signs/Symptoms:food impaction.   COMPARISON: 08/25/2023   ACCESSION NUMBER(S): [id number]   ORDERING CLINICIAN: KEYONNA YODER   TECHNIQUE: 1 view of the chest was performed.   FINDINGS: The lungs are adequately inflated. No acute consolidation. Minimal left basilar  opacity likely atelectasis. No pleural effusion. No pneumothorax.  The cardiomediastinal silhouette is within normal limits. Degenerative changes of the shoulder joints.       No acute cardiopulmonary disease.   Signed by: Wilman Barcenas 11/29/2023 4:01 PM Dictation workstation:   [id number]      Hospital Course  This is  a 89 years old female from [facility] presented to [facility] after mechanical trip and fall.  Admitted for evaluation of left knee pain SP fall.  ELISHA.  CT of the brain no acute finding CT of the neck no acute fracture only degenerative disc disease and spondylosis. X-ray of the knee no fracture.  Patient was taking Coumadin for A-fib INR is supratherapeutic 3.5 today we held Coumadin..  Patient's high blood pressure is under control on amlodipine only I discontinued chlorthalidone 25 mg table due to ELISHA.  Patient A1c is 6.6 on 11/30/2023 I discontinue metformin for ELISHA blood sugar is under control.  Patient is hemodynamically stable will keep pain under control we will send her with prescription of Percocet and gabapentin for 2 days.    Physical exam  Physical Exam  General: alert, no diaphoresis   HENT: mucous membranes moist, external ears normal, no rhinorrhea   Eyes: no icterus or injection, no discharge   Lungs: CTA BL   Heart: RRR, no murmurs, no LE edema BL   GI: abdomen soft, nontender, nondistended, BS present   MSK: no joint effusion or deformity   Skin: no rashes, erythema, or ecchymosis   Neuro: grossly normal cognition, motor strength, sensation     Relevant Results    Lab Results   Component Value Date    WBC 14.7 (H) 12/01/2023    HGB 10.7 (L) 12/01/2023    HCT 33.1 (L) 12/01/2023    MCV 97 12/01/2023     12/01/2023     Lab Results   Component Value Date    GLUCOSE 144 (H) 12/01/2023    CALCIUM 8.5 12/01/2023     12/01/2023    K 4.7 12/01/2023    CO2 17 (L) 12/01/2023     12/01/2023    BUN 38 (H) 12/01/2023    CREATININE 1.50  12/01/2023     Lab Results   Component Value Date    INR 3.5 (H) 12/01/2023    INR 3.9 (H) 11/30/2023    INR 3.8 (H) 11/29/2023    PROTIME 34.1 (H) 12/01/2023    PROTIME 38.2 (H) 11/30/2023    PROTIME 37.0 (H) 11/29/2023            Medication List      START taking these medications     HYDROcodone-acetaminophen 5-325 mg tablet; Commonly known as: Norco;   Take 1 tablet by mouth every 6 hours if needed for severe pain (7 - 10)   for up to 3 days.   polyethylene glycol 17 gram packet; Commonly known as: Glycolax,   Miralax; Take 17 g by mouth once daily.     CONTINUE taking these medications     acetaminophen 325 mg tablet; Commonly known as: Tylenol   amLODIPine 2.5 mg tablet; Commonly known as: Norvasc   amoxicillin-pot clavulanate 500-125 mg tablet; Commonly known as:   Augmentin; Take 1 tablet (500 mg) by mouth every 12 hours for 14 days. Do   not start before December 1, 2023.   colestipol 1 gram tablet; Commonly known as: Colestid   denosumab 60 mg/mL syringe; Commonly known as: Prolia   doxycycline 100 mg capsule; Commonly known as: Vibramycin; Take 1   capsule (100 mg) by mouth every 12 hours for 14 days. Take with at least 8   ounces (large glass) of water, do not lie down for 30 minutes after   gabapentin 100 mg capsule; Commonly known as: Neurontin   latanoprost 0.005 % drops, emulsion   levothyroxine 100 mcg tablet; Commonly known as: Synthroid, Levoxyl   metoprolol succinate XL 25 mg 24 hr tablet; Commonly known as: Toprol-XL   predniSONE 5 mg tablet; Commonly known as: Deltasone     STOP taking these medications     chlorthalidone 25 mg tablet; Commonly known as: Hygroton   metFORMIN 500 mg tablet; Commonly known as: Glucophage   potassium chloride CR 10 mEq ER tablet; Commonly known as: Klor-Con       Outpatient Follow-Up  No future appointments.      Time overall spent on discharge summary 35 minutes    LANRE Yao-CNP

## 2023-12-01 NOTE — ED TRIAGE NOTES
Pt presents to ED from nursing facility for a fall yesterday. Did not hit her head or injured any other extremities. States she has left leg pain 6/10. Pt is A&02-3.

## 2023-12-01 NOTE — PROGRESS NOTES
12/01/23 1715   Discharge Planning   Does the patient need discharge transport arranged? Yes   RoundTrip coordination needed? Yes   Has discharge transport been arranged? No   What day is the transport expected? 12/01/23   What time is the transport expected? 1830   Patient Choice   Provider Choice list and CMS website (https://medicare.gov/care-compare#search) for post-acute Quality and Resource Measure Data were provided and reviewed with: Family;Patient   Patient / Family choosing to utilize agency / facility established prior to hospitalization Yes

## 2023-12-01 NOTE — PROGRESS NOTES
12/01/23 1510   Physical Activity   On average, how many days per week do you engage in moderate to strenuous exercise (like a brisk walk)? 0 days   On average, how many minutes do you engage in exercise at this level? 0 min   Financial Resource Strain   How hard is it for you to pay for the very basics like food, housing, medical care, and heating? Not hard   Housing Stability   In the last 12 months, was there a time when you were not able to pay the mortgage or rent on time? N   In the last 12 months, how many places have you lived? 1   In the last 12 months, was there a time when you did not have a steady place to sleep or slept in a shelter (including now)? N   Transportation Needs   In the past 12 months, has lack of transportation kept you from medical appointments or from getting medications? no   In the past 12 months, has lack of transportation kept you from meetings, work, or from getting things needed for daily living? No   Food Insecurity   Within the past 12 months, you worried that your food would run out before you got the money to buy more. Never true   Within the past 12 months, the food you bought just didn't last and you didn't have money to get more. Never true   Stress   Do you feel stress - tense, restless, nervous, or anxious, or unable to sleep at night because your mind is troubled all the time - these days? Not at all   Social Connections   In a typical week, how many times do you talk on the phone with family, friends, or neighbors? More than 3   How often do you get together with friends or relatives? More than 3   How often do you attend Yazdanism or Pentecostalism services? Never   Do you belong to any clubs or organizations such as Yazdanism groups, unions, fraternal or athletic groups, or school groups? No   How often do you attend meetings of the clubs or organizations you belong to? Never   Are you , , , , never , or living with a partner?     Intimate Partner Violence   Within the last year, have you been afraid of your partner or ex-partner? No   Within the last year, have you been humiliated or emotionally abused in other ways by your partner or ex-partner? No   Within the last year, have you been kicked, hit, slapped, or otherwise physically hurt by your partner or ex-partner? No   Within the last year, have you been raped or forced to have any kind of sexual activity by your partner or ex-partner? No   Alcohol Use   Q1: How often do you have a drink containing alcohol? Never   Q2: How many drinks containing alcohol do you have on a typical day when you are drinking? None   Q3: How often do you have six or more drinks on one occasion? Never   Utilities   In the past 12 months has the electric, gas, oil, or water company threatened to shut off services in your home? No

## 2023-12-01 NOTE — CARE PLAN
Pt has a POA and Living Will Son to bring in documents    ADOD: 1 day    Saw pt and her son Albin in the pts room; pt was discharged from [facility] yesterday; she had a pill stuck in her throat, had it removed. She is a resident at  [facility] [place]  Pt fell today in her apartment and injured her knee  Per pts son Albin, he and his sisters have been trying to talk their mother into going to an assisted living facility; [facility] is full; pt is refusing. Albin said that his mother is no longer able to care for herself and cannot return home to her apt.  Discussed discharge planning with pt; she is in agreement to go to a SNF; pt and son Albin have their 3 choices #1) [facility] #2) [facility] #3) [facility]  Referrals placed to all three. Pt will need a precert  Contacted MARY Lucia via Secure chat and made her aware of the plans.    15:42 [facility] is able to take this pt, they have a bed today and they can also take her the weekend. Contacted Edel Hong, Gia Cazares and Lashaun Maya via Secure Chat and requested that the precert be started.     16:42, have precert for [facility]; notified the facility per [facility]; will complete the PASRR, notify family and the ED staff.    17:03 PASRR completed in [facility]    17:28 Arranged for transport to [facility] for 6:30 PM today; pt went to the floor from the ED; she is currently in bed 439-B    17:31   Notified son Albin (Rommel) of discharge time; notified Denny of discharge time     DISCHARGE PLANNING: [facility] ([place]) TODAY AT 6:30 PM FLOOR NURSE IS AWARE.

## 2023-12-01 NOTE — PROGRESS NOTES
12/01/23 1515   Current Planned Discharge Disposition   Current Planned Discharge Disposition SNF

## 2023-12-01 NOTE — ED PROVIDER NOTES
HPI   No chief complaint on file.      HPI  See my MDM                  Narciso Coma Scale Score: 15                  Patient History   Past Medical History:   Diagnosis Date    Atrial fibrillation (CMS/HCC)     Hyperglycemia     Hyperlipidemia     Hypertension     Hypothyroidism     Osteoarthritis     PMR (polymyalgia rheumatica) (CMS/HCC)     Vitamin D deficiency      Past Surgical History:   Procedure Laterality Date    CATARACT EXTRACTION Bilateral     CHOLECYSTECTOMY      COLONOSCOPY  02/11/2009     Family History   Problem Relation Name Age of Onset    Heart disease Mother      Heart disease Father       Social History     Tobacco Use    Smoking status: Never     Passive exposure: Never    Smokeless tobacco: Never   Substance Use Topics    Alcohol use: Never    Drug use: Not on file       Physical Exam   ED Triage Vitals   Temp Pulse Resp BP   -- -- -- --      SpO2 Temp src Heart Rate Source Patient Position   -- -- -- --      BP Location FiO2 (%)     -- --       Physical Exam  CONSTITUTIONAL: Vital signs reviewed as charted, well-developed and in no distress  Eyes: Extraocular muscles are intact. Pupils equal round and reactive to light. Conjunctiva are pink.  Drainage present around both eyes   ENT: Mucous membranes are moist. Tongue in the midline. Pharynx was without erythema or exudates, uvula midline  LUNGS: Breath sounds equal and clear to auscultation. Good air exchange, no wheezes rales or retractions, pulse oximetry is charted.  HEART: Regular rate and rhythm without murmur thrill or rub, strong tones, auscultation is normal.  ABDOMEN: Soft and nontender without guarding rebound rigidity or mass. Bowel sounds are present and normal in all quadrants. There is no palpable masses or aneurysms identified. No hepatosplenomegaly, normal abdominal exam.  Neuro: The patient is awake, alert and oriented ×3. Moving all 4 extremities and answering questions appropriately.   MUSCULOSKELETAL: Examination of the  left lower extremity does show moderate edema, effusion present some ecchymosis present.  Tenderness throughout the anterior knee itself.  Motor sensation pulses are intact distally.  PSYCH: Awake alert oriented, normal mood and affect.  Skin:  Dry, normal color, warm to the touch, no rash present.      ED Course & MDM   Diagnoses as of 12/01/23 1215   Injury of left knee, initial encounter   Fall, initial encounter   ELISHA (acute kidney injury) (CMS/[facility])       Medical Decision Making  History obtained from: patient    Vital signs, nursing notes, current medications, past medical history, Surgical history, allergies, social history, family History were reviewed.         HPI:  Patient is a 89-year-old female whom lives in the independent living portion [facility] presenting to ED today after sustaining mechanical trip and fall.  States she landed directly onto her left knee.  Has no other complaints or injuries.  She does have some ecchymosis and edema present to the knee.  Was unable to get up on her own.  Denies hitting her head but does take warfarin.  She denies dizziness, chest pain, shortness breath, abdominal pain or extremity edema.  Patient states the drainage in her eyes is chronic she does take medication for it.      10 point ROS was reviewed and negative except Noted above in HPI.  DDX: as listed above    CT scan of the brain interpreted by the radiologist shows:  Impression:    No acute intracranial findings.              CT scan of the neck interpreted by the radiologist shows:  Impression:    1. No acute fracture or spondylolisthesis.  2. Degenerative disc disease and spondylosis.              X-ray of the left knee interpreted by the radiologist shows:  Impression:    No acute fracture or dislocation.              Medications administered during this visit (name and route): ###      MDM Summary/considerations:  Patient 89-year-old female brought to the emergency room today after sustaining  mechanical fall landing directly onto her left knee.  X-rays show severe degenerative arthritis and effusion present but no acute fracture.  Patient unable to bear weight at this time and does live in an independent facility.  Also notably has an ELISHA compared to yesterday.  I did speak with Dr. Fall and she is agreed admit the patient for further evaluation and care.        After reviewing patient's comorbidities, severity of history of presenting illness, labs and imaging if obtained in conjunction with physical exam and course in emergency department, deemed to have potential for deterioration/progression of symptoms that could lead to multiple morbidities or mortality, decision made that patient requires further observation/evaluation/treatment and patient admitted to appropriate service, patient/family understand and agree with plan.      Critical Care: Not warranted at this time    Prescriptions provided include: none    This chart was completed using voice recognition transcription software. Please excuse any errors of transcription including grammatical, punctuation, syntax and spelling errors.  Please contact me with any questions regarding this chart.    Procedure  Procedures     LANRE Ulloa-CNP  12/01/23 8524

## 2023-12-01 NOTE — PROGRESS NOTES
12/01/23 1716   Current Planned Discharge Disposition   Current Planned Discharge Disposition SNF

## 2023-12-01 NOTE — H&P
HPI  HPI  Ina Mcgowan is a 89 y.o. female on day 0 of admission presenting with Knee pain.  This is  a 89 years old female from [facility] presented to [facility], [place], operated by [facility] after mechanical trip and fall.  Patient said that she was in the library , tripped and landed directly onto her left knee did not hit her head.  She did not lose any consciousness.  Denies any dizziness or light headache prior to the fall.  She had difficulty to move her left leg.  due to pain and swelling around left knee.  Denies any nausea vomiting or abdominal pain no swallowing issues.  No fever or chills.  No cough or runny nose.  Denies any chest pain or shortness of breath in room air.  Admits that she has some drainage in her eyes which is chronic she does not take any medication for it. Patient is taking Coumadin for A-fib her INR was 3.5 today     Patient was discharged yesterday with antibiotic for her right lower extremity per ID.  Patient was evaluated with the GI due difficulty swelling which is resolved any intervention.        Past Medical History:   Diagnosis Date    Atrial fibrillation (CMS/HCC)     Hyperglycemia     Hyperlipidemia     Hypertension     Hypothyroidism     Osteoarthritis     PMR (polymyalgia rheumatica) (CMS/[facility])     Vitamin D deficiency        Past Surgical History:   Procedure Laterality Date    CATARACT EXTRACTION Bilateral     CHOLECYSTECTOMY      COLONOSCOPY  02/11/2009       Social History     Socioeconomic History    Marital status: Single     Spouse name: Not on file    Number of children: Not on file    Years of education: Not on file    Highest education level: Not on file   Occupational History    Not on file   Tobacco Use    Smoking status: Never     Passive exposure: Never    Smokeless tobacco: Never   Substance and Sexual Activity    Alcohol use: Never    Drug use: Not on file    Sexual activity: Not on file   Other Topics Concern    Not on file   Social History Narrative     Not on file     Social Determinants of Health     Financial Resource Strain: Low Risk  (11/30/2023)    Overall Financial Resource Strain (CARDIA)     Difficulty of Paying Living Expenses: Not very hard   Recent Concern: Financial Resource Strain - Medium Risk (11/30/2023)    Overall Financial Resource Strain (CARDIA)     Difficulty of Paying Living Expenses: Somewhat hard   Food Insecurity: Not on file   Transportation Needs: No Transportation Needs (11/30/2023)    PRAPARE - Transportation     Lack of Transportation (Medical): No     Lack of Transportation (Non-Medical): No   Physical Activity: Not on file   Stress: Not on file   Social Connections: Not on file   Intimate Partner Violence: Not on file   Housing Stability: Low Risk  (11/30/2023)    Housing Stability Vital Sign     Unable to Pay for Housing in the Last Year: No     Number of Places Lived in the Last Year: 1     Unstable Housing in the Last Year: No   Recent Concern: Housing Stability - High Risk (11/30/2023)    Housing Stability Vital Sign     Unable to Pay for Housing in the Last Year: No     Number of Places Lived in the Last Year: Not on file     Unstable Housing in the Last Year: Yes       Family History   Problem Relation Name Age of Onset    Heart disease Mother      Heart disease Father         Allergies  Patient has no known allergies.    Review of systems  Review of Systems  10 point review systems systems were reviewed and negative except what is noted on HPI.      Physical exam  Physical Exam      General: alert, no diaphoresis   HENT: mucous membranes moist, external ears normal, no rhinorrhea   Eyes: no icterus or injection, no discharge   Lungs: CTA BL   Heart: RRR, no murmurs, no LE edema BL   GI: abdomen soft, nontender, nondistended, BS present   MSK: no joint effusion or deformity   Skin: no rashes, erythema, or ecchymosis   Neuro: grossly normal cognition, motor strength, sensation     XR knee left 1-2 views    Result Date:  12/1/2023  Interpreted By:  Berry Vergara, STUDY: XR KNEE LEFT 1-2 VIEWS; 12/1/2023 9:56 am   INDICATION: Signs/Symptoms:fall;   COMPARISON: None   ACCESSION NUMBER(S): [id number]   ORDERING CLINICIAN: PRINCE TITUS   TECHNIQUE: AP and lateral projection.   FINDINGS: The lateral projection is severely suboptimal.   No fracture or subluxation is identified. Chondrocalcinosis of the menisci can be seen. Severe patellofemoral osteoarthrosis is noted./pain   No suprapatellar joint effusion is identified.       No acute fracture or dislocation.   MACRO: None   Signed by: Berry Vergara 12/1/2023 9:59 AM Dictation workstation:   [id number]    CT cervical spine wo IV contrast    Result Date: 12/1/2023  Interpreted By:  Berry Vergara, STUDY: CT CERVICAL SPINE WO IV CONTRAST; 12/1/2023 9:20 am   INDICATION: Signs/Symptoms:fall;   COMPARISON: None   ACCESSION NUMBER(S): [id number]   ORDERING CLINICIAN: PRINCE TITUS   TECHNIQUE: Contiguous axial images of the cervical spine were performed. PATIENT RADIATION EXPOSURE DATA: CTDI: DLP:   All CT examinations are performed with one or more of the following dose reduction techniques: Automated Exposure Control, adjustment of mA and/or kV according to patient size, or use of iterative reconstruction techniques.   FINDINGS: Significant patient motion artifact is noted.   There is straightening of the normal cervical lordosis.   No acute fracture or spondylolisthesis is identified.   Multilevel disc space narrowing can be seen. Endplate osteophytosis facet arthropathy is noted.   Fusion of C3-C4 on the right is noted.   CPPD arthropathy of the dens is noted.   The neural foramina and spinal canal are grossly patent.   Bilateral TMJ osteoarthrosis is noted.   Calcification of the carotid bulbs can be seen bilaterally.   Limited visualization of the lung apices are unremarkable.       1. No acute fracture or spondylolisthesis. 2. Degenerative disc disease and spondylosis.    MACRO: None.   Signed by: Berry Vregara 12/1/2023 9:33 AM Dictation workstation:   [id number]    CT head wo IV contrast    Result Date: 12/1/2023  Interpreted By:  Berry Vergara, STUDY: CT HEAD WO IV CONTRAST; 12/1/2023 9:20 am   INDICATION: Signs/Symptoms:fall; /tripping injury   COMPARISON: None   ACCESSION NUMBER(S): [id number]   ORDERING CLINICIAN: PRINCE TITUS   TECHNIQUE: Contiguous axial images were acquired from the vertex through the posterior fossa without IV contrast.   All CT examinations are performed with one or more of the following dose reduction techniques: Automated Exposure Control, adjustment of mA and/or kV according to patient size, or use of iterative reconstruction techniques.   FINDINGS: No focal mass effect or midline shift is identified. The ventricles and sulci are symmetric and appropriate for the patient's age. However, chronic involutional change of the cerebral hemispheres is noted.   The gray white matter differentiation is preserved. Nonspecific hypodensities are identified within the periventricular and subcortical white matter likely due to chronic postischemic white matter disease. Atherosclerotic calcifications are seen within the carotid siphons and vertebral arteries.   No acute intracranial hemorrhage is identified. No intra-axial or extra-axial fluid collection is seen.   The visualized paranasal sinuses and mastoid air cells are clear.       No acute intracranial findings.   MACRO: None   Signed by: Berry Vergara 12/1/2023 9:30 AM Dictation workstation:   [id number]    CT abdomen pelvis w IV contrast    Result Date: 11/29/2023  Interpreted By:  Osiris Steward, STUDY: CT ABDOMEN PELVIS W IV CONTRAST; 11/29/2023 5:15 pm   INDICATION: Signs/Symptoms:epigastric pain / vomiting;   COMPARISON: None   ACCESSION NUMBER(S): [id number]   ORDERING CLINICIAN: KEYONNA YODER   TECHNIQUE: Contiguous axial images of the abdomen/pelvis were performed with IV contrast. 75  ml of Omnipaque 350 was utilized. All CT examinations are performed with 1 or more of the following dose reduction techniques: Automated exposure control, adjustment of mA and/or kv according to patient's size, or use of iterative reconstruction techniques.   FINDINGS:   The liver,  common bile duct, pancreas, spleen, and adrenal glands are unremarkable. Pneumobilia is noted and may be related to with sphincterotomy/surgical intervention.   The kidneys enhance symmetrically.  No urolithiasis is seen. No hydroureteronephrosis is seen.   The visualized aorta is unremarkable.   The small bowel is not dilated. The appendix is normal. There is diffuse colonic diverticulosis without evidence of diverticulitis.   No free intraperitoneal air or fluid is seen.   The bladder is well distended with no gross wall thickening.   The visualized osseous structures are intact.   Limited images of the lower thorax are unremarkable.       No acute abdominopelvic pathology. Status post cholecystectomy. Pneumobilia which is noted likely related to for sphincterotomy/instrumentation. Small hiatal hernia. There is diffuse colonic diverticulosis without evidence of diverticulitis.   MACRO: None   Signed by: Osiris Steward 11/29/2023 5:30 PM Dictation workstation:   [id number]    XR chest 1 view    Result Date: 11/29/2023  Interpreted By:  Wilman Barcenas, STUDY: XR CHEST 1 VIEW; 11/29/2023 2:35 pm   INDICATION: Signs/Symptoms:food impaction.   COMPARISON: 08/25/2023   ACCESSION NUMBER(S): [id number]   ORDERING CLINICIAN: KEYONNA YODER   TECHNIQUE: 1 view of the chest was performed.   FINDINGS: The lungs are adequately inflated. No acute consolidation. Minimal left basilar opacity likely atelectasis. No pleural effusion. No pneumothorax.  The cardiomediastinal silhouette is within normal limits. Degenerative changes of the shoulder joints.       No acute cardiopulmonary disease.   Signed by: Wilman Barcenas 11/29/2023 4:01 PM  "Dictation workstation:   [id number]      Last Recorded Vitals  Blood pressure (!) 145/96, pulse 96, resp. rate 17, height 1.575 m (5' 2\"), weight 68.8 kg (151 lb 10.8 oz), SpO2 100 %.  Intake/Output last 3 Shifts:  No intake/output data recorded.    Relevant Results    Lab Results   Component Value Date    WBC 14.7 (H) 12/01/2023    HGB 10.7 (L) 12/01/2023    HCT 33.1 (L) 12/01/2023    MCV 97 12/01/2023     12/01/2023       Lab Results   Component Value Date    GLUCOSE 144 (H) 12/01/2023    CALCIUM 8.5 12/01/2023     12/01/2023    K 4.7 12/01/2023    CO2 17 (L) 12/01/2023     12/01/2023    BUN 38 (H) 12/01/2023    CREATININE 1.50 12/01/2023       Lab Results   Component Value Date    HGBA1C 6.6 (H) 11/30/2023         CT head wo IV contrast    Result Date: 12/1/2023  Interpreted By:  Berry Vergara, STUDY: CT HEAD WO IV CONTRAST; 12/1/2023 9:20 am   INDICATION: Signs/Symptoms:fall; /tripping injury   COMPARISON: None   ACCESSION NUMBER(S): [id number]   ORDERING CLINICIAN: PRINCE TITUS   TECHNIQUE: Contiguous axial images were acquired from the vertex through the posterior fossa without IV contrast.   All CT examinations are performed with one or more of the following dose reduction techniques: Automated Exposure Control, adjustment of mA and/or kV according to patient size, or use of iterative reconstruction techniques.   FINDINGS: No focal mass effect or midline shift is identified. The ventricles and sulci are symmetric and appropriate for the patient's age. However, chronic involutional change of the cerebral hemispheres is noted.   The gray white matter differentiation is preserved. Nonspecific hypodensities are identified within the periventricular and subcortical white matter likely due to chronic postischemic white matter disease. Atherosclerotic calcifications are seen within the carotid siphons and vertebral arteries.   No acute intracranial hemorrhage is identified. No intra-axial " or extra-axial fluid collection is seen.   The visualized paranasal sinuses and mastoid air cells are clear.       No acute intracranial findings.   MACRO: None   Signed by: Berry Vergara 12/1/2023 9:30 AM Dictation workstation:   [id number]     Scheduled medications    Continuous medications    PRN medications      Assessment/Plan   Principal Problem:    Knee pain  Active Problems:    Fall    Left knee pain SP fall/mechanical fall   CT of the brain no acute finding CT of the neck no acute fracture only degenerative disc disease and spondylosis  X-ray of the knee no fracture  Keep pain under control ice pad    ELISHA  Cr Up to 1.5 was 0.82 days ago  Metformin and chlorthalidone were held     Cellulitis of the right lower leg  Continue with antibiotic coverage Augmentin and doxycycline for 14 days per ID consult  Follow-up with the wound culture as outpatient    Atrial fibrillation  Heart rate is controlled.  On metoprolol succinate   on Coumadin .  INR is supratherapeutic at 3.5.  Hold Coumadin and repeat INR     Polymyalgia  rheumatica  She is on daily prednisone     Hypertension  On amlodipine  Discontinue chlorthalidone 25 mg tablet      Hypothyroidism  On levothyroxine     Diabetes type 2  Discontinue metformin  A1c 6 .6 on 11/30/23       Discharge plan:  Anticipate discharging patient to  skilled of nursing when precert is available    I spent 35 minutes in the professional and overall care of this patient.    LANRE Yao-CNP

## 2023-12-01 NOTE — PROGRESS NOTES
12/01/23 1514   [facility] Disability Status   Are you deaf or do you have serious difficulty hearing? N   Are you blind or do you have serious difficulty seeing, even when wearing glasses? N   Because of a physical, mental, or emotional condition, do you have serious difficulty concentrating, remembering, or making decisions? (5 years old or older) Y   Do you have serious difficulty walking or climbing stairs? Y   Do you have serious difficulty dressing or bathing? Y   Because of a physical, mental, or emotional condition, do you have serious difficulty doing errands alone such as visiting the doctor? Y

## 2023-12-01 NOTE — PROGRESS NOTES
12/01/23 1513   Discharge Planning   Living Arrangements Alone   Support Systems Family members   Type of Residence Private residence   Number of Stairs to Enter Residence 0   Number of Stairs Within Residence 0   Do you have animals or pets at home? No   Care Facility Name Pt [facility]   Who is requesting discharge planning? Provider   Home or Post Acute Services Post acute facilities (Rehab/SNF/etc)   Type of Post Acute Facility Services Skilled nursing   Patient expects to be discharged to: SNF   Does the patient need discharge transport arranged? Yes   RoundTrip coordination needed? Yes   Has discharge transport been arranged? No   Financial Resource Strain   How hard is it for you to pay for the very basics like food, housing, medical care, and heating? Not hard   Housing Stability   In the last 12 months, was there a time when you were not able to pay the mortgage or rent on time? N   In the last 12 months, how many places have you lived? 1   In the last 12 months, was there a time when you did not have a steady place to sleep or slept in a shelter (including now)? N   Transportation Needs   In the past 12 months, has lack of transportation kept you from medical appointments or from getting medications? no   In the past 12 months, has lack of transportation kept you from meetings, work, or from getting things needed for daily living? No   Patient Choice   Provider Choice list and CMS website (https://medicare.gov/care-compare#search) for post-acute Quality and Resource Measure Data were provided and reviewed with: Family;Patient   Patient / Family choosing to utilize agency / facility established prior to hospitalization Yes

## 2023-12-02 ENCOUNTER — LAB REQUISITION (OUTPATIENT)
Dept: LAB | Facility: HOSPITAL | Age: 88
End: 2023-12-02
Payer: MEDICARE

## 2023-12-02 DIAGNOSIS — I48.20 CHRONIC ATRIAL FIBRILLATION, UNSPECIFIED (MULTI): ICD-10-CM

## 2023-12-02 LAB
INR PPP: 3.3 (ref 0.9–1.1)
PROTHROMBIN TIME: 38.1 SECONDS (ref 9.8–12.8)

## 2023-12-02 PROCEDURE — 85610 PROTHROMBIN TIME: CPT

## 2023-12-06 LAB
BACTERIA SPEC CULT: ABNORMAL
BACTERIA SPEC CULT: ABNORMAL
GRAM STN SPEC: ABNORMAL
GRAM STN SPEC: ABNORMAL

## 2023-12-14 ENCOUNTER — HOSPITAL ENCOUNTER (INPATIENT)
Facility: HOSPITAL | Age: 88
LOS: 5 days | Discharge: NURSING, REHAB OR LONG-TERM CARE | DRG: 605 | End: 2023-12-19
Attending: STUDENT IN AN ORGANIZED HEALTH CARE EDUCATION/TRAINING PROGRAM | Admitting: INTERNAL MEDICINE
Payer: MEDICARE

## 2023-12-14 ENCOUNTER — APPOINTMENT (OUTPATIENT)
Dept: RADIOLOGY | Facility: HOSPITAL | Age: 88
DRG: 605 | End: 2023-12-14
Payer: MEDICARE

## 2023-12-14 DIAGNOSIS — M79.89 LEFT ARM SWELLING: ICD-10-CM

## 2023-12-14 DIAGNOSIS — R01.1 MURMUR: ICD-10-CM

## 2023-12-14 DIAGNOSIS — S20.212A HEMATOMA OF LEFT CHEST WALL, INITIAL ENCOUNTER: Primary | ICD-10-CM

## 2023-12-14 DIAGNOSIS — I48.91 ATRIAL FIBRILLATION, UNSPECIFIED TYPE (MULTI): ICD-10-CM

## 2023-12-14 DIAGNOSIS — M25.569 KNEE PAIN, UNSPECIFIED CHRONICITY, UNSPECIFIED LATERALITY: ICD-10-CM

## 2023-12-14 DIAGNOSIS — R79.1 SUPRATHERAPEUTIC INR: ICD-10-CM

## 2023-12-14 LAB
ABO GROUP (TYPE) IN BLOOD: NORMAL
ABO GROUP (TYPE) IN BLOOD: NORMAL
ALBUMIN SERPL-MCNC: 3 G/DL (ref 3.5–5)
ALP BLD-CCNC: 83 U/L (ref 35–125)
ALT SERPL-CCNC: 26 U/L (ref 5–40)
ANION GAP SERPL CALC-SCNC: 13 MMOL/L
ANION GAP SERPL CALC-SCNC: 18 MMOL/L
ANTIBODY SCREEN: NORMAL
APPEARANCE UR: ABNORMAL
APTT PPP: 46.1 SECONDS (ref 22–32.5)
AST SERPL-CCNC: 40 U/L (ref 5–40)
BACTERIA #/AREA URNS AUTO: ABNORMAL /HPF
BASO STIPL BLD QL SMEAR: PRESENT
BASOPHILS # BLD AUTO: 0.01 X10*3/UL (ref 0–0.1)
BASOPHILS NFR BLD AUTO: 0.1 %
BILIRUB SERPL-MCNC: 0.9 MG/DL (ref 0.1–1.2)
BILIRUB UR STRIP.AUTO-MCNC: NEGATIVE MG/DL
BLOOD EXPIRATION DATE: NORMAL
BLOOD EXPIRATION DATE: NORMAL
BUN SERPL-MCNC: 50 MG/DL (ref 8–25)
BUN SERPL-MCNC: 59 MG/DL (ref 8–25)
BURR CELLS BLD QL SMEAR: NORMAL
CALCIUM SERPL-MCNC: 7.5 MG/DL (ref 8.5–10.4)
CALCIUM SERPL-MCNC: 8.9 MG/DL (ref 8.5–10.4)
CHLORIDE SERPL-SCNC: 104 MMOL/L (ref 97–107)
CHLORIDE SERPL-SCNC: 98 MMOL/L (ref 97–107)
CO2 SERPL-SCNC: 19 MMOL/L (ref 24–31)
CO2 SERPL-SCNC: 20 MMOL/L (ref 24–31)
COLOR UR: YELLOW
CREAT SERPL-MCNC: 1.4 MG/DL (ref 0.4–1.6)
CREAT SERPL-MCNC: 1.7 MG/DL (ref 0.4–1.6)
DISPENSE STATUS: NORMAL
DISPENSE STATUS: NORMAL
EOSINOPHIL # BLD AUTO: 0 X10*3/UL (ref 0–0.4)
EOSINOPHIL NFR BLD AUTO: 0 %
ERYTHROCYTE [DISTWIDTH] IN BLOOD BY AUTOMATED COUNT: 14.1 % (ref 11.5–14.5)
GFR SERPL CREATININE-BSD FRML MDRD: 28 ML/MIN/1.73M*2
GFR SERPL CREATININE-BSD FRML MDRD: 35 ML/MIN/1.73M*2
GLUCOSE SERPL-MCNC: 161 MG/DL (ref 65–99)
GLUCOSE SERPL-MCNC: 165 MG/DL (ref 65–99)
GLUCOSE UR STRIP.AUTO-MCNC: NORMAL MG/DL
HCT VFR BLD AUTO: 16.6 % (ref 36–46)
HCT VFR BLD AUTO: 24.7 % (ref 36–46)
HGB BLD-MCNC: 5.2 G/DL (ref 12–16)
HGB BLD-MCNC: 7.7 G/DL (ref 12–16)
HOLD SPECIMEN: NORMAL
HYALINE CASTS #/AREA URNS AUTO: ABNORMAL /LPF
IMM GRANULOCYTES # BLD AUTO: 0.19 X10*3/UL (ref 0–0.5)
IMM GRANULOCYTES NFR BLD AUTO: 1.5 % (ref 0–0.9)
INR PPP: 4.5 (ref 0.9–1.2)
KETONES UR STRIP.AUTO-MCNC: ABNORMAL MG/DL
LEUKOCYTE ESTERASE UR QL STRIP.AUTO: ABNORMAL
LIPASE SERPL-CCNC: 80 U/L (ref 16–63)
LYMPHOCYTES # BLD AUTO: 1.73 X10*3/UL (ref 0.8–3)
LYMPHOCYTES NFR BLD AUTO: 13.6 %
MCH RBC QN AUTO: 31 PG (ref 26–34)
MCHC RBC AUTO-ENTMCNC: 31.2 G/DL (ref 32–36)
MCV RBC AUTO: 100 FL (ref 80–100)
MONOCYTES # BLD AUTO: 1.79 X10*3/UL (ref 0.05–0.8)
MONOCYTES NFR BLD AUTO: 14.1 %
MUCOUS THREADS #/AREA URNS AUTO: ABNORMAL /LPF
NEUTROPHILS # BLD AUTO: 9.02 X10*3/UL (ref 1.6–5.5)
NEUTROPHILS NFR BLD AUTO: 70.7 %
NITRITE UR QL STRIP.AUTO: NEGATIVE
NRBC BLD-RTO: 0.2 /100 WBCS (ref 0–0)
OVALOCYTES BLD QL SMEAR: NORMAL
PH UR STRIP.AUTO: 5 [PH]
PLATELET # BLD AUTO: 410 X10*3/UL (ref 150–450)
PLATELET CLUMP BLD QL SMEAR: PRESENT
POTASSIUM SERPL-SCNC: 4.2 MMOL/L (ref 3.4–5.1)
POTASSIUM SERPL-SCNC: 5.4 MMOL/L (ref 3.4–5.1)
PRODUCT BLOOD TYPE: 6200
PRODUCT BLOOD TYPE: 6200
PRODUCT CODE: NORMAL
PRODUCT CODE: NORMAL
PROT SERPL-MCNC: 5.4 G/DL (ref 5.9–7.9)
PROT UR STRIP.AUTO-MCNC: ABNORMAL MG/DL
PROTHROMBIN TIME: 43.2 SECONDS (ref 9.3–12.7)
RBC # BLD AUTO: 2.48 X10*6/UL (ref 4–5.2)
RBC # UR STRIP.AUTO: NEGATIVE /UL
RBC #/AREA URNS AUTO: ABNORMAL /HPF
RBC MORPH BLD: NORMAL
RH FACTOR (ANTIGEN D): NORMAL
RH FACTOR (ANTIGEN D): NORMAL
SODIUM SERPL-SCNC: 135 MMOL/L (ref 133–145)
SODIUM SERPL-SCNC: 137 MMOL/L (ref 133–145)
SP GR UR STRIP.AUTO: 1.03
SQUAMOUS #/AREA URNS AUTO: ABNORMAL /HPF
TRANS CELLS #/AREA UR COMP ASSIST: ABNORMAL /HPF
TROPONIN T SERPL-MCNC: 65 NG/L
TROPONIN T SERPL-MCNC: 70 NG/L
TROPONIN T SERPL-MCNC: 80 NG/L
TSH SERPL DL<=0.05 MIU/L-ACNC: 1.54 MIU/L (ref 0.27–4.2)
UNIT ABO: NORMAL
UNIT ABO: NORMAL
UNIT NUMBER: NORMAL
UNIT NUMBER: NORMAL
UNIT RH: NORMAL
UNIT RH: NORMAL
UNIT VOLUME: 317
UNIT VOLUME: 367
UROBILINOGEN UR STRIP.AUTO-MCNC: NORMAL MG/DL
WBC # BLD AUTO: 12.7 X10*3/UL (ref 4.4–11.3)
WBC #/AREA URNS AUTO: ABNORMAL /HPF

## 2023-12-14 PROCEDURE — 84484 ASSAY OF TROPONIN QUANT: CPT | Performed by: STUDENT IN AN ORGANIZED HEALTH CARE EDUCATION/TRAINING PROGRAM

## 2023-12-14 PROCEDURE — 2550000001 HC RX 255 CONTRASTS: Performed by: STUDENT IN AN ORGANIZED HEALTH CARE EDUCATION/TRAINING PROGRAM

## 2023-12-14 PROCEDURE — 83690 ASSAY OF LIPASE: CPT | Performed by: STUDENT IN AN ORGANIZED HEALTH CARE EDUCATION/TRAINING PROGRAM

## 2023-12-14 PROCEDURE — 85610 PROTHROMBIN TIME: CPT | Performed by: STUDENT IN AN ORGANIZED HEALTH CARE EDUCATION/TRAINING PROGRAM

## 2023-12-14 PROCEDURE — 2500000004 HC RX 250 GENERAL PHARMACY W/ HCPCS (ALT 636 FOR OP/ED): Performed by: STUDENT IN AN ORGANIZED HEALTH CARE EDUCATION/TRAINING PROGRAM

## 2023-12-14 PROCEDURE — P9017 PLASMA 1 DONOR FRZ W/IN 8 HR: HCPCS

## 2023-12-14 PROCEDURE — 84443 ASSAY THYROID STIM HORMONE: CPT | Performed by: NURSE PRACTITIONER

## 2023-12-14 PROCEDURE — 2500000001 HC RX 250 WO HCPCS SELF ADMINISTERED DRUGS (ALT 637 FOR MEDICARE OP): Performed by: NURSE PRACTITIONER

## 2023-12-14 PROCEDURE — 86920 COMPATIBILITY TEST SPIN: CPT

## 2023-12-14 PROCEDURE — 2500000004 HC RX 250 GENERAL PHARMACY W/ HCPCS (ALT 636 FOR OP/ED): Performed by: NURSE PRACTITIONER

## 2023-12-14 PROCEDURE — 85730 THROMBOPLASTIN TIME PARTIAL: CPT | Performed by: STUDENT IN AN ORGANIZED HEALTH CARE EDUCATION/TRAINING PROGRAM

## 2023-12-14 PROCEDURE — 80053 COMPREHEN METABOLIC PANEL: CPT | Performed by: STUDENT IN AN ORGANIZED HEALTH CARE EDUCATION/TRAINING PROGRAM

## 2023-12-14 PROCEDURE — 71260 CT THORAX DX C+: CPT

## 2023-12-14 PROCEDURE — 2060000001 HC INTERMEDIATE ICU ROOM DAILY

## 2023-12-14 PROCEDURE — 2500000004 HC RX 250 GENERAL PHARMACY W/ HCPCS (ALT 636 FOR OP/ED): Performed by: INTERNAL MEDICINE

## 2023-12-14 PROCEDURE — 36415 COLL VENOUS BLD VENIPUNCTURE: CPT | Performed by: STUDENT IN AN ORGANIZED HEALTH CARE EDUCATION/TRAINING PROGRAM

## 2023-12-14 PROCEDURE — 81001 URINALYSIS AUTO W/SCOPE: CPT | Performed by: STUDENT IN AN ORGANIZED HEALTH CARE EDUCATION/TRAINING PROGRAM

## 2023-12-14 PROCEDURE — 85014 HEMATOCRIT: CPT | Performed by: NURSE PRACTITIONER

## 2023-12-14 PROCEDURE — 99285 EMERGENCY DEPT VISIT HI MDM: CPT | Performed by: STUDENT IN AN ORGANIZED HEALTH CARE EDUCATION/TRAINING PROGRAM

## 2023-12-14 PROCEDURE — 70450 CT HEAD/BRAIN W/O DYE: CPT

## 2023-12-14 PROCEDURE — 87086 URINE CULTURE/COLONY COUNT: CPT | Mod: WESLAB | Performed by: STUDENT IN AN ORGANIZED HEALTH CARE EDUCATION/TRAINING PROGRAM

## 2023-12-14 PROCEDURE — 86901 BLOOD TYPING SEROLOGIC RH(D): CPT | Performed by: STUDENT IN AN ORGANIZED HEALTH CARE EDUCATION/TRAINING PROGRAM

## 2023-12-14 PROCEDURE — 36430 TRANSFUSION BLD/BLD COMPNT: CPT

## 2023-12-14 PROCEDURE — 85025 COMPLETE CBC W/AUTO DIFF WBC: CPT | Performed by: STUDENT IN AN ORGANIZED HEALTH CARE EDUCATION/TRAINING PROGRAM

## 2023-12-14 PROCEDURE — 80048 BASIC METABOLIC PNL TOTAL CA: CPT | Mod: CCI | Performed by: NURSE PRACTITIONER

## 2023-12-14 RX ORDER — LATANOPROST 50 UG/ML
1 SOLUTION/ DROPS OPHTHALMIC NIGHTLY
Status: DISCONTINUED | OUTPATIENT
Start: 2023-12-14 | End: 2023-12-19 | Stop reason: HOSPADM

## 2023-12-14 RX ORDER — ACETAMINOPHEN 650 MG/1
650 SUPPOSITORY RECTAL EVERY 4 HOURS PRN
Status: DISCONTINUED | OUTPATIENT
Start: 2023-12-14 | End: 2023-12-19 | Stop reason: HOSPADM

## 2023-12-14 RX ORDER — BUTYROSPERMUM PARKII(SHEA BUTTER), SIMMONDSIA CHINENSIS (JOJOBA) SEED OIL, ALOE BARBADENSIS LEAF EXTRACT .01; 1; 3.5 G/100G; G/100G; G/100G
250 LIQUID TOPICAL DAILY
COMMUNITY
End: 2023-12-19 | Stop reason: HOSPADM

## 2023-12-14 RX ORDER — HYDRALAZINE HYDROCHLORIDE 10 MG/1
10 TABLET, FILM COATED ORAL DAILY PRN
COMMUNITY
End: 2023-12-19 | Stop reason: HOSPADM

## 2023-12-14 RX ORDER — PREDNISONE 10 MG/1
5 TABLET ORAL DAILY
Status: DISCONTINUED | OUTPATIENT
Start: 2023-12-14 | End: 2023-12-14

## 2023-12-14 RX ORDER — HYDROCODONE BITARTRATE AND ACETAMINOPHEN 5; 325 MG/1; MG/1
1 TABLET ORAL EVERY 6 HOURS PRN
COMMUNITY
End: 2023-12-19 | Stop reason: HOSPADM

## 2023-12-14 RX ORDER — ACETAMINOPHEN 325 MG/1
650 TABLET ORAL EVERY 6 HOURS PRN
Status: DISCONTINUED | OUTPATIENT
Start: 2023-12-14 | End: 2023-12-14 | Stop reason: SDUPTHER

## 2023-12-14 RX ORDER — ACETAMINOPHEN 325 MG/1
650 TABLET ORAL EVERY 4 HOURS PRN
Status: DISCONTINUED | OUTPATIENT
Start: 2023-12-14 | End: 2023-12-19 | Stop reason: HOSPADM

## 2023-12-14 RX ORDER — POLYETHYLENE GLYCOL 3350 17 G/17G
17 POWDER, FOR SOLUTION ORAL DAILY
Status: DISCONTINUED | OUTPATIENT
Start: 2023-12-14 | End: 2023-12-19 | Stop reason: HOSPADM

## 2023-12-14 RX ORDER — PREDNISONE 20 MG/1
20 TABLET ORAL
Status: DISCONTINUED | OUTPATIENT
Start: 2023-12-15 | End: 2023-12-14

## 2023-12-14 RX ORDER — LEVOTHYROXINE SODIUM 100 UG/1
100 TABLET ORAL
Status: DISCONTINUED | OUTPATIENT
Start: 2023-12-15 | End: 2023-12-19 | Stop reason: HOSPADM

## 2023-12-14 RX ORDER — BISACODYL 5 MG
10 TABLET, DELAYED RELEASE (ENTERIC COATED) ORAL DAILY PRN
Status: DISCONTINUED | OUTPATIENT
Start: 2023-12-14 | End: 2023-12-19 | Stop reason: HOSPADM

## 2023-12-14 RX ORDER — HYDROCODONE BITARTRATE AND ACETAMINOPHEN 5; 325 MG/1; MG/1
1 TABLET ORAL EVERY 6 HOURS PRN
Status: DISCONTINUED | OUTPATIENT
Start: 2023-12-14 | End: 2023-12-19 | Stop reason: HOSPADM

## 2023-12-14 RX ORDER — FENTANYL CITRATE 50 UG/ML
25 INJECTION, SOLUTION INTRAMUSCULAR; INTRAVENOUS ONCE
Status: COMPLETED | OUTPATIENT
Start: 2023-12-14 | End: 2023-12-14

## 2023-12-14 RX ORDER — CEFTRIAXONE 1 G/50ML
1 INJECTION, SOLUTION INTRAVENOUS ONCE
Status: COMPLETED | OUTPATIENT
Start: 2023-12-14 | End: 2023-12-14

## 2023-12-14 RX ORDER — ACETAMINOPHEN 160 MG/5ML
650 SOLUTION ORAL EVERY 4 HOURS PRN
Status: DISCONTINUED | OUTPATIENT
Start: 2023-12-14 | End: 2023-12-19 | Stop reason: HOSPADM

## 2023-12-14 RX ORDER — AMLODIPINE BESYLATE 5 MG/1
2.5 TABLET ORAL DAILY
Status: CANCELLED | OUTPATIENT
Start: 2023-12-14

## 2023-12-14 RX ORDER — GABAPENTIN 100 MG/1
100 CAPSULE ORAL 3 TIMES DAILY
Status: DISCONTINUED | OUTPATIENT
Start: 2023-12-14 | End: 2023-12-19 | Stop reason: HOSPADM

## 2023-12-14 RX ORDER — AMLODIPINE BESYLATE 2.5 MG/1
2.5 TABLET ORAL DAILY
Status: DISCONTINUED | OUTPATIENT
Start: 2023-12-14 | End: 2023-12-19 | Stop reason: HOSPADM

## 2023-12-14 RX ORDER — ONDANSETRON 4 MG/1
4 TABLET, FILM COATED ORAL EVERY 8 HOURS PRN
Status: DISCONTINUED | OUTPATIENT
Start: 2023-12-14 | End: 2023-12-19 | Stop reason: HOSPADM

## 2023-12-14 RX ORDER — ACETAMINOPHEN 325 MG/1
650 TABLET ORAL EVERY 6 HOURS PRN
Status: CANCELLED | OUTPATIENT
Start: 2023-12-14

## 2023-12-14 RX ORDER — METOPROLOL SUCCINATE 100 MG/1
100 TABLET, EXTENDED RELEASE ORAL DAILY
Status: DISCONTINUED | OUTPATIENT
Start: 2023-12-14 | End: 2023-12-19 | Stop reason: HOSPADM

## 2023-12-14 RX ORDER — BUTYROSPERMUM PARKII(SHEA BUTTER), SIMMONDSIA CHINENSIS (JOJOBA) SEED OIL, ALOE BARBADENSIS LEAF EXTRACT .01; 1; 3.5 G/100G; G/100G; G/100G
250 LIQUID TOPICAL DAILY
Status: DISCONTINUED | OUTPATIENT
Start: 2023-12-14 | End: 2023-12-14 | Stop reason: RX

## 2023-12-14 RX ORDER — CEFTRIAXONE 1 G/50ML
1 INJECTION, SOLUTION INTRAVENOUS EVERY 24 HOURS
Status: DISCONTINUED | OUTPATIENT
Start: 2023-12-15 | End: 2023-12-16

## 2023-12-14 RX ORDER — ONDANSETRON HYDROCHLORIDE 2 MG/ML
4 INJECTION, SOLUTION INTRAVENOUS EVERY 8 HOURS PRN
Status: DISCONTINUED | OUTPATIENT
Start: 2023-12-14 | End: 2023-12-19 | Stop reason: HOSPADM

## 2023-12-14 RX ORDER — MONTELUKAST SODIUM 4 MG/1
2 TABLET, CHEWABLE ORAL 2 TIMES DAILY
Status: DISCONTINUED | OUTPATIENT
Start: 2023-12-14 | End: 2023-12-18

## 2023-12-14 RX ADMIN — SODIUM CHLORIDE 1000 ML: 900 INJECTION, SOLUTION INTRAVENOUS at 10:53

## 2023-12-14 RX ADMIN — PREDNISONE 5 MG: 10 TABLET ORAL at 14:35

## 2023-12-14 RX ADMIN — PHYTONADIONE 10 MG: 10 INJECTION, EMULSION INTRAMUSCULAR; INTRAVENOUS; SUBCUTANEOUS at 12:00

## 2023-12-14 RX ADMIN — IOHEXOL 75 ML: 350 INJECTION, SOLUTION INTRAVENOUS at 11:13

## 2023-12-14 RX ADMIN — COLESTIPOL HYDROCHLORIDE 2 G: 1 TABLET ORAL at 20:31

## 2023-12-14 RX ADMIN — CEFTRIAXONE SODIUM 1 G: 1 INJECTION, SOLUTION INTRAVENOUS at 12:29

## 2023-12-14 RX ADMIN — SODIUM CHLORIDE 1000 ML: 900 INJECTION, SOLUTION INTRAVENOUS at 15:59

## 2023-12-14 RX ADMIN — LATANOPROST 1 DROP: 50 SOLUTION OPHTHALMIC at 20:32

## 2023-12-14 RX ADMIN — FENTANYL CITRATE 25 MCG: 0.05 INJECTION, SOLUTION INTRAMUSCULAR; INTRAVENOUS at 12:28

## 2023-12-14 RX ADMIN — GABAPENTIN 100 MG: 100 CAPSULE ORAL at 20:33

## 2023-12-14 RX ADMIN — POLYETHYLENE GLYCOL 3350 17 G: 17 POWDER, FOR SOLUTION ORAL at 14:35

## 2023-12-14 RX ADMIN — HYDROCORTISONE SODIUM SUCCINATE 50 MG: 100 INJECTION, POWDER, FOR SOLUTION INTRAMUSCULAR; INTRAVENOUS at 20:32

## 2023-12-14 ASSESSMENT — LIFESTYLE VARIABLES
EVER FELT BAD OR GUILTY ABOUT YOUR DRINKING: NO
REASON UNABLE TO ASSESS: NO
EVER HAD A DRINK FIRST THING IN THE MORNING TO STEADY YOUR NERVES TO GET RID OF A HANGOVER: NO
HAVE YOU EVER FELT YOU SHOULD CUT DOWN ON YOUR DRINKING: NO
HAVE PEOPLE ANNOYED YOU BY CRITICIZING YOUR DRINKING: NO

## 2023-12-14 ASSESSMENT — ENCOUNTER SYMPTOMS
FEVER: 0
COUGH: 0
VOMITING: 0
CONSTIPATION: 0
DIZZINESS: 0
BACK PAIN: 1
ABDOMINAL DISTENTION: 0
NAUSEA: 0
DIARRHEA: 0
LIGHT-HEADEDNESS: 0
RHINORRHEA: 0
SHORTNESS OF BREATH: 0
FATIGUE: 0
ABDOMINAL PAIN: 0
APPETITE CHANGE: 0
CHEST TIGHTNESS: 0
NUMBNESS: 0
DYSURIA: 0
PALPITATIONS: 0

## 2023-12-14 ASSESSMENT — PAIN SCALES - GENERAL
PAINLEVEL_OUTOF10: 9
PAINLEVEL_OUTOF10: 9

## 2023-12-14 ASSESSMENT — PAIN - FUNCTIONAL ASSESSMENT
PAIN_FUNCTIONAL_ASSESSMENT: 0-10
PAIN_FUNCTIONAL_ASSESSMENT: 0-10

## 2023-12-14 ASSESSMENT — COLUMBIA-SUICIDE SEVERITY RATING SCALE - C-SSRS
2. HAVE YOU ACTUALLY HAD ANY THOUGHTS OF KILLING YOURSELF?: NO
6. HAVE YOU EVER DONE ANYTHING, STARTED TO DO ANYTHING, OR PREPARED TO DO ANYTHING TO END YOUR LIFE?: NO
1. IN THE PAST MONTH, HAVE YOU WISHED YOU WERE DEAD OR WISHED YOU COULD GO TO SLEEP AND NOT WAKE UP?: NO

## 2023-12-14 ASSESSMENT — PAIN DESCRIPTION - LOCATION: LOCATION: BACK

## 2023-12-14 NOTE — ED NOTES
Patient is a 89-year-old female that presents the emergency department for evaluation of left flank bleeding and bruising as well as swelling.  It was first noted by nursing staff yesterday afternoon that patient had some bruising in the left side of the chest wall as well as left flank.  It has been slowly progressing and becoming more prominent throughout the last 24 hours.  Patient is on warfarin due to history of atrial fibrillation.  Patient denies any fall or trauma.  She does admit to an aching pain along the left chest wall and flank.  Nursing home staff did not notice any falls within the last 24 to 48 hours.           PMHX:  Past Medical History:   Diagnosis Date    Atrial fibrillation (CMS/HCC)     Hyperglycemia     Hyperlipidemia     Hypertension     Hypothyroidism     Osteoarthritis     PMR (polymyalgia rheumatica) (CMS/HCC)     Vitamin D deficiency         No Known Allergies      LABS:     Latest Reference Range & Units 12/14/23 10:54   GLUCOSE 65 - 99 mg/dL 161 (H)   SODIUM 133 - 145 mmol/L 135   POTASSIUM 3.4 - 5.1 mmol/L 5.4 (H)   CHLORIDE 97 - 107 mmol/L 98   Bicarbonate 24 - 31 mmol/L 19 (L)   Anion Gap <=19 mmol/L 18   Blood Urea Nitrogen 8 - 25 mg/dL 59 (H)   Creatinine 0.40 - 1.60 mg/dL 1.70 (H)   EGFR >60 mL/min/1.73m*2 28 (L)   Calcium 8.5 - 10.4 mg/dL 8.9   Albumin 3.5 - 5.0 g/dL 3.0 (L)   Alkaline Phosphatase 35 - 125 U/L 83   ALT 5 - 40 U/L 26   AST 5 - 40 U/L 40   Bilirubin Total 0.1 - 1.2 mg/dL 0.9   Total Protein 5.9 - 7.9 g/dL 5.4 (L)   LIPASE 16 - 63 U/L 80 (H)   INR 0.9 - 1.2  4.5 (H)   Protime 9.3 - 12.7 seconds 43.2 (H)   aPTT 22.0 - 32.5 seconds 46.1 (H)   WBC 4.4 - 11.3 x10*3/uL 12.7 (H)   nRBC 0.0 - 0.0 /100 WBCs 0.2 (H)   RBC 4.00 - 5.20 x10*6/uL 2.48 (L)   HEMOGLOBIN 12.0 - 16.0 g/dL 7.7 (L)   HEMATOCRIT 36.0 - 46.0 % 24.7 (L)   MCV 80 - 100 fL 100   MCH 26.0 - 34.0 pg 31.0   MCHC 32.0 - 36.0 g/dL 31.2 (L)   RED CELL DISTRIBUTION WIDTH 11.5 - 14.5 % 14.1   Platelets 150 -  450 x10*3/uL 410   (H): Data is abnormally high  (L): Data is abnormally low   Latest Reference Range & Units 12/14/23 11:38   Color, Urine Light-Yellow, Yellow, Dark-Yellow  Yellow   Appearance, Urine Clear  Turbid !   Specific Gravity, Urine 1.005 - 1.035  1.029   pH, Urine 5.0, 5.5, 6.0, 6.5, 7.0, 7.5, 8.0  5.0   Protein, Urine NEGATIVE, 10 (TRACE), 20 (TRACE) mg/dL 10 (TRACE)   Glucose, Urine Normal mg/dL Normal   Blood, Urine NEGATIVE  NEGATIVE   Ketones, Urine NEGATIVE mg/dL TRACE !   Bilirubin, Urine NEGATIVE  NEGATIVE   Urobilinogen, Urine Normal mg/dL Normal   Nitrite, Urine NEGATIVE  NEGATIVE   Leukocyte Esterase, Urine NEGATIVE  500 Jameel/µL !   !: Data is abnormal   Latest Reference Range & Units 12/14/23 10:54 12/14/23 13:12   Troponin T, High Sensitivity <=15 ng/L 80 (H) 65 (H)   (H): Data is abnormally high          PLAN: FFP, ADMIT                   Kassie Adams, RN  12/14/23 [id number]

## 2023-12-14 NOTE — H&P
Chief Complaint: hematoma    History Of Present Illness  Ina Mcgowan is a 89 y.o. female with a past medical history of atrial fibrillation, on Coumadin, hyperlipidemia, hypertension, hypothyroidism, and osteoarthritis who presented to the emergency department with a hematoma.  Patient's son states that he was called last evening and told that his mother had a bruise.  Patient is currently at [facility] on the skilled nursing side however she moved from independent to assisted living at [facility] when she finishes rehab.  Patient is alert and oriented with intermittent confusion.  She does not remember falling or any sort of injury.  Son states that yesterday she was doing well.  Denies chest pain, shortness of breath, fevers, chills, nausea, or vomiting. No abdominal discomfort. Denies lightheadedness or dizziness. No dysuria.  Patient had been on antibiotics for cellulitis that appears to have resolved.  Son states that this morning visited his mother and was told by the nurse that they were sending her to the emergency room.  Patient was noted to have a large hematoma to her left side to her left back.    In the emergency department: Her INR was elevated at 4.5.  She was hyperkalemic with a potassium of 5.4.  Acute kidney injury with a BUN/creatinine 59/1.7.  Baseline is 0.8 however on 12/1/2023 her creatinine was 1.5.  WBC was 12.7.  She was anemic with a hemoglobin of 7.7, decreased from 10.7 on 12/1/2023. Platelets are 410.  Her UA was positive, culture pending.  Patient was given IV ceftriaxone, fentanyl, vitamin K, IV fluids, and FFP in the ED.  Admitted to [facility] for further evaluation and treatment.     Past Medical History  Past Medical History:   Diagnosis Date    Atrial fibrillation (CMS/[facility])     Hyperglycemia     Hyperlipidemia     Hypertension     Hypothyroidism     Osteoarthritis     PMR (polymyalgia rheumatica) (CMS/[facility])     Vitamin D deficiency        Surgical History  Past  Surgical History:   Procedure Laterality Date    CATARACT EXTRACTION Bilateral     CHOLECYSTECTOMY      COLONOSCOPY  02/11/2009        Social History  She reports that she has never smoked. She has never been exposed to tobacco smoke. She has never used smokeless tobacco. She reports that she does not drink alcohol. No history on file for drug use.    Family History  Family History   Problem Relation Name Age of Onset    Heart disease Mother      Heart disease Father          Allergies  Patient has no known allergies.    Review of Systems   Constitutional:  Negative for appetite change, fatigue and fever.   HENT:  Negative for congestion and rhinorrhea.    Respiratory:  Negative for cough, chest tightness and shortness of breath.    Cardiovascular:  Negative for chest pain and palpitations.   Gastrointestinal:  Negative for abdominal distention, abdominal pain, constipation, diarrhea, nausea and vomiting.   Genitourinary:  Negative for dysuria and urgency.   Musculoskeletal:  Positive for back pain.   Neurological:  Negative for dizziness, light-headedness and numbness.   All other systems reviewed and are negative.       Physical Exam  Vitals reviewed.   Constitutional:       Comments: Elderly. Limited historian.    HENT:      Head: Normocephalic and atraumatic.      Nose: Nose normal.      Mouth/Throat:      Mouth: Mucous membranes are moist.   Eyes:      Extraocular Movements: Extraocular movements intact.      Conjunctiva/sclera: Conjunctivae normal.   Cardiovascular:      Rate and Rhythm: Normal rate and regular rhythm.   Pulmonary:      Effort: Pulmonary effort is normal.      Breath sounds: Normal breath sounds. No wheezing, rhonchi or rales.   Abdominal:      General: Bowel sounds are normal.      Palpations: Abdomen is soft.      Tenderness: There is no abdominal tenderness.   Musculoskeletal:         General: Normal range of motion.      Cervical back: Normal range of motion and neck supple.   Skin:      General: Skin is warm and dry.      Capillary Refill: Capillary refill takes less than 2 seconds.      Comments: Large hematoma to left chest, left side, left back   Neurological:      General: No focal deficit present.      Mental Status: She is alert and oriented to person, place, and time.   Psychiatric:         Mood and Affect: Mood normal.         Behavior: Behavior normal.          Last Recorded Vitals  Blood pressure 115/55, pulse 66, resp. rate 16, SpO2 98 %.    Relevant Results  Lab Results   Component Value Date    WBC 12.7 (H) 12/14/2023    HGB 7.7 (L) 12/14/2023    HCT 24.7 (L) 12/14/2023     12/14/2023     12/14/2023     Lab Results   Component Value Date    GLUCOSE 161 (H) 12/14/2023    CALCIUM 8.9 12/14/2023     12/14/2023    K 5.4 (H) 12/14/2023    CO2 19 (L) 12/14/2023    CL 98 12/14/2023    BUN 59 (H) 12/14/2023    CREATININE 1.70 (H) 12/14/2023          Assessment/Plan   Principal Problem:    Hematoma of left chest wall, initial encounter    Hematoma left chest, side, back  Noted large hematoma, unsure if there was a fall or injury, patient does not recall  INR elevated at 4.5, H/H 7.7/24.7  Given Vitamin K and FFP in ED  Trend H/H  Monitor hematoma close  Will get STAT CT head due to unknown cause of injury and active bleed with supratherapeutic INR    Supratherapeutic INR  INR 4.5  Hold Coumadin, takes for A fib  Daily INR    Atrial Fibrillation  Monitor on tele  Hold metoprolol for now due to bleed/low blood pressure    Hypertension  Hold antihypertensives for now due to active bleed/low blood pressure    Hyperlipidemia  Resume cholesterol medication    Hypothyroidism  Resume home levothyroxine  Check TSH    Acute kidney injury  Given IV fluids  Repeat BMP in AM  Consult nephrology if no improvement    Hyperkalemia  Potassium 5.4, repeat, monitor    Urinary Tract Infection  UA positive, culture pending  IV ceftriaxone, follow culture    Plan  Admit to SDU  Monitor on  tele  Trend H/H  Monitor blood pressure  Hold antihypertensives  Repeat CBC and BMP  CT head  DVT prophylaxis: Hold Coumadin  Daily INR    Code Status discussed with the son and patient, she is a DNRCCA DNI           MALLORY Moon  I saw and examined patient in the emergency room.  She is receiving first unit of fresh frozen plasma now.  Her blood pressure is borderline drops at times.  Patient denies shortness of breath.  She denies chest pain.  Will introduce another liter of normal saline bolus to improve blood pressure.  Continues on chronic prednisone therapy.  Will use stress dose steroids given massive blood loss and hypotension.  Will use IV Solu-Cortef as a stress dose steroid  Critical care spent with this patient 43 minutes.

## 2023-12-14 NOTE — ED PROVIDER NOTES
HPI   No chief complaint on file.      Patient is a 89-year-old female that presents the emergency department for evaluation of left flank bleeding and bruising as well as swelling.  It was first noted by nursing staff yesterday afternoon that patient had some bruising in the left side of the chest wall as well as left flank.  It has been slowly progressing and becoming more prominent throughout the last 24 hours.  Patient is on warfarin due to history of atrial fibrillation.  Patient denies any fall or trauma.  She does admit to an aching pain along the left chest wall and flank.  Nursing home staff did not notice any falls within the last 24 to 48 hours.      History provided by:  Patient                      No data recorded                Patient History   Past Medical History:   Diagnosis Date    Atrial fibrillation (CMS/[facility])     Hyperglycemia     Hyperlipidemia     Hypertension     Hypothyroidism     Osteoarthritis     PMR (polymyalgia rheumatica) (CMS/[facility])     Vitamin D deficiency      Past Surgical History:   Procedure Laterality Date    CATARACT EXTRACTION Bilateral     CHOLECYSTECTOMY      COLONOSCOPY  02/11/2009     Family History   Problem Relation Name Age of Onset    Heart disease Mother      Heart disease Father       Social History     Tobacco Use    Smoking status: Never     Passive exposure: Never    Smokeless tobacco: Never   Substance Use Topics    Alcohol use: Never    Drug use: Not on file       Physical Exam   ED Triage Vitals   Temp Pulse Resp BP   -- -- -- --      SpO2 Temp src Heart Rate Source Patient Position   -- -- -- --      BP Location FiO2 (%)     -- --       Physical Exam  Vitals and nursing note reviewed.   Constitutional:       General: She is not in acute distress.     Appearance: Normal appearance. She is ill-appearing.   HENT:      Head: Normocephalic and atraumatic.      Mouth/Throat:      Mouth: Mucous membranes are moist.   Eyes:      Extraocular Movements: Extraocular  movements intact.      Pupils: Pupils are equal, round, and reactive to light.   Cardiovascular:      Rate and Rhythm: Normal rate and regular rhythm.   Pulmonary:      Effort: Pulmonary effort is normal.      Breath sounds: Normal breath sounds.   Abdominal:      General: Abdomen is flat.      Tenderness: There is no abdominal tenderness. There is left CVA tenderness. There is no right CVA tenderness, guarding or rebound.   Musculoskeletal:         General: Swelling (Significant area of soft tissue swelling along the left chest wall without crepitus consistent with a hematoma) present.   Skin:     Findings: Bruising (There is significant bruising on the along the left lateral chest wall as well as the left flank) present.   Neurological:      General: No focal deficit present.      Mental Status: She is alert and oriented to person, place, and time.       Recent Results (from the past 24 hour(s))   CBC and Auto Differential    Collection Time: 12/14/23 10:54 AM   Result Value Ref Range    WBC 12.7 (H) 4.4 - 11.3 x10*3/uL    nRBC 0.2 (H) 0.0 - 0.0 /100 WBCs    RBC 2.48 (L) 4.00 - 5.20 x10*6/uL    Hemoglobin 7.7 (L) 12.0 - 16.0 g/dL    Hematocrit 24.7 (L) 36.0 - 46.0 %     80 - 100 fL    MCH 31.0 26.0 - 34.0 pg    MCHC 31.2 (L) 32.0 - 36.0 g/dL    RDW 14.1 11.5 - 14.5 %    Platelets 410 150 - 450 x10*3/uL    Neutrophils % 70.7 40.0 - 80.0 %    Immature Granulocytes %, Automated 1.5 (H) 0.0 - 0.9 %    Lymphocytes % 13.6 13.0 - 44.0 %    Monocytes % 14.1 2.0 - 10.0 %    Eosinophils % 0.0 0.0 - 6.0 %    Basophils % 0.1 0.0 - 2.0 %    Neutrophils Absolute 9.02 (H) 1.60 - 5.50 x10*3/uL    Immature Granulocytes Absolute, Automated 0.19 0.00 - 0.50 x10*3/uL    Lymphocytes Absolute 1.73 0.80 - 3.00 x10*3/uL    Monocytes Absolute 1.79 (H) 0.05 - 0.80 x10*3/uL    Eosinophils Absolute 0.00 0.00 - 0.40 x10*3/uL    Basophils Absolute 0.01 0.00 - 0.10 x10*3/uL   Comprehensive Metabolic Panel    Collection Time: 12/14/23  10:54 AM   Result Value Ref Range    Glucose 161 (H) 65 - 99 mg/dL    Sodium 135 133 - 145 mmol/L    Potassium 5.4 (H) 3.4 - 5.1 mmol/L    Chloride 98 97 - 107 mmol/L    Bicarbonate 19 (L) 24 - 31 mmol/L    Urea Nitrogen 59 (H) 8 - 25 mg/dL    Creatinine 1.70 (H) 0.40 - 1.60 mg/dL    eGFR 28 (L) >60 mL/min/1.73m*2    Calcium 8.9 8.5 - 10.4 mg/dL    Albumin 3.0 (L) 3.5 - 5.0 g/dL    Alkaline Phosphatase 83 35 - 125 U/L    Total Protein 5.4 (L) 5.9 - 7.9 g/dL    AST 40 5 - 40 U/L    Bilirubin, Total 0.9 0.1 - 1.2 mg/dL    ALT 26 5 - 40 U/L    Anion Gap 18 <=19 mmol/L   Protime-INR    Collection Time: 12/14/23 10:54 AM   Result Value Ref Range    Protime 43.2 (H) 9.3 - 12.7 seconds    INR 4.5 (H) 0.9 - 1.2   APTT    Collection Time: 12/14/23 10:54 AM   Result Value Ref Range    aPTT 46.1 (H) 22.0 - 32.5 seconds   Lipase    Collection Time: 12/14/23 10:54 AM   Result Value Ref Range    Lipase 80 (H) 16 - 63 U/L   Type and Screen    Collection Time: 12/14/23 10:54 AM   Result Value Ref Range    ABO TYPE A     Rh TYPE POS     ANTIBODY SCREEN NEG    Serial Troponin, Initial ([facility])    Collection Time: 12/14/23 10:54 AM   Result Value Ref Range    Troponin T, High Sensitivity 80 (H) <=15 ng/L   Morphology    Collection Time: 12/14/23 10:54 AM   Result Value Ref Range    RBC Morphology See Below     Ovalocytes Few     Pepe Cells Many     Basophilic Stippling Present     Clumped Platelets Present    Urinalysis with Reflex Microscopic and Culture    Collection Time: 12/14/23 11:38 AM   Result Value Ref Range    Color, Urine Yellow Light-Yellow, Yellow, Dark-Yellow    Appearance, Urine Turbid (N) Clear    Specific Gravity, Urine 1.029 1.005 - 1.035    pH, Urine 5.0 5.0, 5.5, 6.0, 6.5, 7.0, 7.5, 8.0    Protein, Urine 10 (TRACE) NEGATIVE, 10 (TRACE), 20 (TRACE) mg/dL    Glucose, Urine Normal Normal mg/dL    Blood, Urine NEGATIVE NEGATIVE    Ketones, Urine TRACE (A) NEGATIVE mg/dL    Bilirubin, Urine NEGATIVE NEGATIVE     Urobilinogen, Urine Normal Normal mg/dL    Nitrite, Urine NEGATIVE NEGATIVE    Leukocyte Esterase, Urine 500 Jameel/µL (A) NEGATIVE   Extra Urine Gray Tube    Collection Time: 12/14/23 11:38 AM   Result Value Ref Range    Extra Tube Hold for add-ons.    Microscopic Only, Urine    Collection Time: 12/14/23 11:38 AM   Result Value Ref Range    WBC, Urine 11-20 (A) 1-5, NONE /HPF    RBC, Urine NONE NONE, 1-2, 3-5 /HPF    Squamous Epithelial Cells, Urine 10-25 (FEW) Reference range not established. /HPF    Transitional Epithelial Cells, Urine 3-5 (1+) Reference range not established. /HPF    Bacteria, Urine 1+ (A) NONE SEEN /HPF    Mucus, Urine FEW Reference range not established. /LPF    Hyaline Casts, Urine 3+ (A) NONE /LPF   Prepare Plasma: 2 Units    Collection Time: 12/14/23 12:14 PM   Result Value Ref Range    PRODUCT CODE E2287A55     Unit Number [id number]     Unit ABO A     Unit RH POS     Dispense Status XM     Blood Expiration Date December 19, 2023 13:14 EST     PRODUCT BLOOD TYPE 6200     UNIT VOLUME 317     PRODUCT CODE C5443V54     Unit Number [id number]     Unit ABO A     Unit RH POS     Dispense Status IS     Blood Expiration Date December 19, 2023 13:11 EST     PRODUCT BLOOD TYPE 6200     UNIT VOLUME 367    Serial Troponin, 2 Hour ([facility])    Collection Time: 12/14/23  1:12 PM   Result Value Ref Range    Troponin T, High Sensitivity 65 (H) <=15 ng/L   VERIFY ABO/Rh Group Test    Collection Time: 12/14/23  1:12 PM   Result Value Ref Range    ABO TYPE A     Rh TYPE POS        ED Course & MDM   ED Course as of 12/14/23 1214   Thu Dec 14, 2023   1115 EKG Time: 1054  EKG Interpretation time: 1055  EKG Interpretation: EKG shows sinus rhythm first-degree AV block, right bundle branch block, QTc 487, no evidence of STEMI.    EKG was interpreted by myself independently [JL]      ED Course User Index  [JL] Joe Logan DO         Diagnoses as of 12/14/23 1214   Hematoma of left chest wall, initial  encounter   Supratherapeutic INR       Medical Decision Making  Patient is a 89-year-old female that presents emergency department for evaluation of left flank bleeding and bruising.  She does have significant swelling as well as bruising of the left chest wall, flank and back.  It is tender to palpation.  There is no lacerations or wounds noted.  Patient is awake, alert and oriented.  She was initially hypotensive on initial presentation with a blood pressure in the 60s systolic.  She was given IV fluids at that time with improvement of her blood pressure up to 115 systolic.  Blood work initially ordered including CBC, CMP, PT/INR, PTT as well as type and screen.  CT scan chest abdomen pelvis was ordered given concern for possible trauma as well as evaluation of the swelling of the soft tissue.  No intra-abdominal process was found as well as no evidence of rib fractures, pneumothorax.  There was a large fluid collection in the soft tissue along the chest wall consistent with a hematoma which is also consistent with physical exam findings.  She was found to be anemic with a hemoglobin of 7.7 which is significantly decreased from patient's blood work 13 days prior of 10.  She also had supratherapeutic INR of 4.5 and was given vitamin K and will be transfused 2 units of FFP for reversal of the INR.  I did discuss the importance of providing the FFP and transfusing as well as the risks involved with the patient and she is agreeable for transfusion at this time.  Patient agrees with her current documentation that she would not want compressions or intubation and remains DNR Comfort Care arrest at this time.  In discussion with hospitalist she agrees with reversal of INR and will likely transfuse patient unit of blood given the progressive hematoma as well as patient's significant drop in her hemoglobin level over the last 2 weeks.  Patient admitted to stepdown unit.        Procedure  Procedures     Joe Logan,  DO  12/14/23 1542

## 2023-12-14 NOTE — ED NOTES
PT IS IN MIDDLE OF SECOND UNIT OF FFP, SHE IS TOLERATING IT WITHOUT INCIDENT, VSS, ASYMPTOMATIC.  CURRENTLY HER BP IS IMPROVING WITH NS IV BOLUS.     Kassie Adams, RN  12/14/23 8000

## 2023-12-14 NOTE — ED NOTES
FFP COMPLETE, PT TOLERATED WITHOUT INCIDENT, VSS, ASYMPTOMATIC      Kassie A Bryan, RN  12/14/23 7611

## 2023-12-15 LAB
ANION GAP SERPL CALC-SCNC: 14 MMOL/L
BLOOD EXPIRATION DATE: NORMAL
BUN SERPL-MCNC: 54 MG/DL (ref 8–25)
CALCIUM SERPL-MCNC: 7.8 MG/DL (ref 8.5–10.4)
CHLORIDE SERPL-SCNC: 108 MMOL/L (ref 97–107)
CO2 SERPL-SCNC: 20 MMOL/L (ref 24–31)
CREAT SERPL-MCNC: 1.4 MG/DL (ref 0.4–1.6)
DISPENSE STATUS: NORMAL
ERYTHROCYTE [DISTWIDTH] IN BLOOD BY AUTOMATED COUNT: 15.2 % (ref 11.5–14.5)
GFR SERPL CREATININE-BSD FRML MDRD: 35 ML/MIN/1.73M*2
GLUCOSE SERPL-MCNC: 121 MG/DL (ref 65–99)
HCT VFR BLD AUTO: 24 % (ref 36–46)
HCT VFR BLD AUTO: 25.1 % (ref 36–46)
HCT VFR BLD AUTO: 25.1 % (ref 36–46)
HCT VFR BLD AUTO: 25.3 % (ref 36–46)
HCT VFR BLD AUTO: 26.2 % (ref 36–46)
HGB BLD-MCNC: 8.2 G/DL (ref 12–16)
HGB BLD-MCNC: 8.4 G/DL (ref 12–16)
HGB BLD-MCNC: 8.7 G/DL (ref 12–16)
INR PPP: 1.2 (ref 0.9–1.2)
MCH RBC QN AUTO: 30.5 PG (ref 26–34)
MCHC RBC AUTO-ENTMCNC: 32.7 G/DL (ref 32–36)
MCV RBC AUTO: 93 FL (ref 80–100)
NRBC BLD-RTO: 0.3 /100 WBCS (ref 0–0)
PLATELET # BLD AUTO: 262 X10*3/UL (ref 150–450)
POTASSIUM SERPL-SCNC: 4.2 MMOL/L (ref 3.4–5.1)
PRODUCT BLOOD TYPE: 5100
PRODUCT BLOOD TYPE: 6200
PRODUCT BLOOD TYPE: 6200
PRODUCT CODE: NORMAL
PROTHROMBIN TIME: 13 SECONDS (ref 9.3–12.7)
RBC # BLD AUTO: 2.69 X10*6/UL (ref 4–5.2)
SODIUM SERPL-SCNC: 142 MMOL/L (ref 133–145)
UNIT ABO: NORMAL
UNIT NUMBER: NORMAL
UNIT RH: NORMAL
UNIT VOLUME: 350
WBC # BLD AUTO: 15.7 X10*3/UL (ref 4.4–11.3)
XM INTEP: NORMAL

## 2023-12-15 PROCEDURE — 85018 HEMOGLOBIN: CPT | Performed by: NURSE PRACTITIONER

## 2023-12-15 PROCEDURE — 85027 COMPLETE CBC AUTOMATED: CPT | Performed by: NURSE PRACTITIONER

## 2023-12-15 PROCEDURE — 2500000004 HC RX 250 GENERAL PHARMACY W/ HCPCS (ALT 636 FOR OP/ED): Performed by: INTERNAL MEDICINE

## 2023-12-15 PROCEDURE — 99221 1ST HOSP IP/OBS SF/LOW 40: CPT

## 2023-12-15 PROCEDURE — 2500000004 HC RX 250 GENERAL PHARMACY W/ HCPCS (ALT 636 FOR OP/ED): Performed by: NURSE PRACTITIONER

## 2023-12-15 PROCEDURE — 36415 COLL VENOUS BLD VENIPUNCTURE: CPT | Performed by: NURSE PRACTITIONER

## 2023-12-15 PROCEDURE — 85610 PROTHROMBIN TIME: CPT | Performed by: NURSE PRACTITIONER

## 2023-12-15 PROCEDURE — 85014 HEMATOCRIT: CPT | Performed by: NURSE PRACTITIONER

## 2023-12-15 PROCEDURE — 36430 TRANSFUSION BLD/BLD COMPNT: CPT

## 2023-12-15 PROCEDURE — P9016 RBC LEUKOCYTES REDUCED: HCPCS

## 2023-12-15 PROCEDURE — 2500000001 HC RX 250 WO HCPCS SELF ADMINISTERED DRUGS (ALT 637 FOR MEDICARE OP): Performed by: NURSE PRACTITIONER

## 2023-12-15 PROCEDURE — 82374 ASSAY BLOOD CARBON DIOXIDE: CPT | Performed by: NURSE PRACTITIONER

## 2023-12-15 PROCEDURE — 99222 1ST HOSP IP/OBS MODERATE 55: CPT | Performed by: STUDENT IN AN ORGANIZED HEALTH CARE EDUCATION/TRAINING PROGRAM

## 2023-12-15 PROCEDURE — 2060000001 HC INTERMEDIATE ICU ROOM DAILY

## 2023-12-15 RX ADMIN — GABAPENTIN 100 MG: 100 CAPSULE ORAL at 18:19

## 2023-12-15 RX ADMIN — GABAPENTIN 100 MG: 100 CAPSULE ORAL at 09:16

## 2023-12-15 RX ADMIN — COLESTIPOL HYDROCHLORIDE 2 G: 1 TABLET ORAL at 09:17

## 2023-12-15 RX ADMIN — CEFTRIAXONE SODIUM 1 G: 1 INJECTION, SOLUTION INTRAVENOUS at 12:00

## 2023-12-15 RX ADMIN — HYDROCORTISONE SODIUM SUCCINATE 50 MG: 100 INJECTION, POWDER, FOR SOLUTION INTRAMUSCULAR; INTRAVENOUS at 22:13

## 2023-12-15 RX ADMIN — LATANOPROST 1 DROP: 50 SOLUTION OPHTHALMIC at 22:12

## 2023-12-15 RX ADMIN — HYDROCORTISONE SODIUM SUCCINATE 50 MG: 100 INJECTION, POWDER, FOR SOLUTION INTRAMUSCULAR; INTRAVENOUS at 09:16

## 2023-12-15 RX ADMIN — LEVOTHYROXINE SODIUM 100 MCG: 0.1 TABLET ORAL at 06:23

## 2023-12-15 SDOH — SOCIAL STABILITY: SOCIAL INSECURITY: WERE YOU ABLE TO COMPLETE ALL THE BEHAVIORAL HEALTH SCREENINGS?: YES

## 2023-12-15 SDOH — SOCIAL STABILITY: SOCIAL INSECURITY: ARE THERE ANY APPARENT SIGNS OF INJURIES/BEHAVIORS THAT COULD BE RELATED TO ABUSE/NEGLECT?: NO

## 2023-12-15 SDOH — SOCIAL STABILITY: SOCIAL INSECURITY: DO YOU FEEL UNSAFE GOING BACK TO THE PLACE WHERE YOU ARE LIVING?: NO

## 2023-12-15 SDOH — SOCIAL STABILITY: SOCIAL INSECURITY: HAVE YOU HAD THOUGHTS OF HARMING ANYONE ELSE?: NO

## 2023-12-15 SDOH — SOCIAL STABILITY: SOCIAL INSECURITY: ARE YOU OR HAVE YOU BEEN THREATENED OR ABUSED PHYSICALLY, EMOTIONALLY, OR SEXUALLY BY ANYONE?: NO

## 2023-12-15 SDOH — SOCIAL STABILITY: SOCIAL INSECURITY: ABUSE: ADULT

## 2023-12-15 SDOH — SOCIAL STABILITY: SOCIAL INSECURITY: HAS ANYONE EVER THREATENED TO HURT YOUR FAMILY OR YOUR PETS?: NO

## 2023-12-15 SDOH — SOCIAL STABILITY: SOCIAL INSECURITY: DOES ANYONE TRY TO KEEP YOU FROM HAVING/CONTACTING OTHER FRIENDS OR DOING THINGS OUTSIDE YOUR HOME?: NO

## 2023-12-15 SDOH — SOCIAL STABILITY: SOCIAL INSECURITY: DO YOU FEEL ANYONE HAS EXPLOITED OR TAKEN ADVANTAGE OF YOU FINANCIALLY OR OF YOUR PERSONAL PROPERTY?: NO

## 2023-12-15 ASSESSMENT — COGNITIVE AND FUNCTIONAL STATUS - GENERAL
TOILETING: A LITTLE
MOBILITY SCORE: 18
WALKING IN HOSPITAL ROOM: A LITTLE
WALKING IN HOSPITAL ROOM: A LITTLE
MOBILITY SCORE: 18
MOVING FROM LYING ON BACK TO SITTING ON SIDE OF FLAT BED WITH BEDRAILS: A LITTLE
CLIMB 3 TO 5 STEPS WITH RAILING: A LITTLE
MOVING FROM LYING ON BACK TO SITTING ON SIDE OF FLAT BED WITH BEDRAILS: A LITTLE
CLIMB 3 TO 5 STEPS WITH RAILING: A LITTLE
MOVING TO AND FROM BED TO CHAIR: A LITTLE
TURNING FROM BACK TO SIDE WHILE IN FLAT BAD: A LITTLE
TURNING FROM BACK TO SIDE WHILE IN FLAT BAD: A LITTLE
CLIMB 3 TO 5 STEPS WITH RAILING: A LITTLE
DRESSING REGULAR LOWER BODY CLOTHING: A LITTLE
DRESSING REGULAR LOWER BODY CLOTHING: A LITTLE
STANDING UP FROM CHAIR USING ARMS: A LITTLE
MOVING TO AND FROM BED TO CHAIR: A LITTLE
STANDING UP FROM CHAIR USING ARMS: A LITTLE
PATIENT BASELINE BEDBOUND: NO
DAILY ACTIVITIY SCORE: 18
HELP NEEDED FOR BATHING: A LOT
PERSONAL GROOMING: A LITTLE
MOVING TO AND FROM BED TO CHAIR: A LITTLE
MOVING FROM LYING ON BACK TO SITTING ON SIDE OF FLAT BED WITH BEDRAILS: A LITTLE
MOBILITY SCORE: 18
DRESSING REGULAR UPPER BODY CLOTHING: A LITTLE
STANDING UP FROM CHAIR USING ARMS: A LITTLE
WALKING IN HOSPITAL ROOM: A LITTLE
TURNING FROM BACK TO SIDE WHILE IN FLAT BAD: A LITTLE

## 2023-12-15 ASSESSMENT — ACTIVITIES OF DAILY LIVING (ADL)
ADEQUATE_TO_COMPLETE_ADL: YES
HEARING - RIGHT EAR: FUNCTIONAL
WALKS IN HOME: NEEDS ASSISTANCE
FEEDING YOURSELF: INDEPENDENT
ASSISTIVE_DEVICE: WALKER
BATHING: NEEDS ASSISTANCE
JUDGMENT_ADEQUATE_SAFELY_COMPLETE_DAILY_ACTIVITIES: YES
DRESSING YOURSELF: NEEDS ASSISTANCE
TOILETING: NEEDS ASSISTANCE
PATIENT'S MEMORY ADEQUATE TO SAFELY COMPLETE DAILY ACTIVITIES?: YES
HEARING - LEFT EAR: FUNCTIONAL
GROOMING: NEEDS ASSISTANCE

## 2023-12-15 ASSESSMENT — LIFESTYLE VARIABLES
HOW OFTEN DO YOU HAVE 6 OR MORE DRINKS ON ONE OCCASION: NEVER
HOW MANY STANDARD DRINKS CONTAINING ALCOHOL DO YOU HAVE ON A TYPICAL DAY: PATIENT DOES NOT DRINK
SUBSTANCE_ABUSE_PAST_12_MONTHS: NO
PRESCIPTION_ABUSE_PAST_12_MONTHS: NO
HOW OFTEN DO YOU HAVE A DRINK CONTAINING ALCOHOL: NEVER
SKIP TO QUESTIONS 9-10: 1
AUDIT-C TOTAL SCORE: 0
AUDIT-C TOTAL SCORE: 0

## 2023-12-15 ASSESSMENT — ENCOUNTER SYMPTOMS
SHORTNESS OF BREATH: 0
LIGHT-HEADEDNESS: 0
COUGH: 0
WOUND: 0
ABDOMINAL PAIN: 0
DIZZINESS: 0
BRUISES/BLEEDS EASILY: 1
CARDIOVASCULAR NEGATIVE: 1
BACK PAIN: 1
VOMITING: 0
FEVER: 0
ABDOMINAL DISTENTION: 0
RESPIRATORY NEGATIVE: 1
CHEST TIGHTNESS: 0

## 2023-12-15 ASSESSMENT — PAIN SCALES - GENERAL: PAINLEVEL_OUTOF10: 0 - NO PAIN

## 2023-12-15 NOTE — CONSULTS
Assessment/Plan     Inpatient consult to Acute Care Surgery  Consult performed by: MALLORY Peters  Consult ordered by: MALLORY Moon  Reason for consult: left Flank hematoma  Assessment/Recommendations: Large left flank hematoma with scattered ecchymosis to left flank.  I am hesitant to apply pressure dressing to site due to restriction of proper lung expansion and the risk of atelectasis in a 89-year-old patient.  Will reassess the hematoma in a few hours and if there is an increase in size will apply slight pressure dressing.  There does not appear to be a need for drainage of the hematoma.  The skin appears to be viable at this time.  No signs of necrosis or pressure injury.  Continue to trend hemoglobin.  Would not recommend restarting anticoagulation at this time.  Will follow.        Subjective     This is a very pleasant 89-year-old that presents from [facility] with a large left flank hematoma.  Patient has a history of A-fib on Coumadin.  CT of the abdomen yesterday confirmed a subcutaneous left chest hematoma without fractures.  She denies any falls or trauma.  Does report tenderness to her left side. her initial hemoglobin was 7.7 yesterday and dropped to 5.2 over approximately 12 hours.  She received vitamin K in the ED last night.  She also received 1 unit of blood.  Her hemoglobin is 8.2 today.        Review of Systems  Review of Systems   Constitutional:  Negative for fever.   Respiratory: Negative.  Negative for cough, chest tightness and shortness of breath.    Cardiovascular: Negative.  Negative for chest pain.   Gastrointestinal:  Negative for abdominal distention, abdominal pain and vomiting.   Musculoskeletal:  Positive for back pain.   Skin:  Negative for wound.        bruising left side   Neurological:  Negative for dizziness, syncope and light-headedness.   Hematological:  Bruises/bleeds easily.       Objective     Vital signs for last 24  hours:  Temp:  [36 °C (96.8 °F)-36.9 °C (98.4 °F)] 36.4 °C (97.6 °F)  Heart Rate:  [59-84] 64  Resp:  [11-21] 14  BP: ()/(39-86) 113/55    Intake/Output this shift:  No intake/output data recorded.    Physical Exam  Physical Exam  Vitals reviewed.   Constitutional:       General: She is not in acute distress.  HENT:      Head: Normocephalic and atraumatic.   Cardiovascular:      Rate and Rhythm: Normal rate and regular rhythm.   Pulmonary:      Effort: No respiratory distress.      Breath sounds: Normal breath sounds. No wheezing, rhonchi or rales.   Chest:      Chest wall: No tenderness.   Abdominal:      General: Abdomen is flat. Bowel sounds are normal. There is no distension.      Palpations: Abdomen is soft.      Tenderness: There is no abdominal tenderness.   Musculoskeletal:         General: No swelling or tenderness.   Skin:     General: Skin is warm and dry.             Comments: Ecchymosis extends from hematoma site to left shoulder and down to left buttocks   Neurological:      Mental Status: She is alert and oriented to person, place, and time.   Psychiatric:         Behavior: Behavior is cooperative.         Labs  Lab Results   Component Value Date    WBC 15.7 (H) 12/15/2023    HGB 8.2 (L) 12/15/2023    HGB 8.2 (L) 12/15/2023    HCT 25.1 (L) 12/15/2023    HCT 25.1 (L) 12/15/2023    MCV 93 12/15/2023     12/15/2023     === 12/14/23 ===    CT HEAD WO IV CONTRAST    - Impression -  1. No acute intracranial findings.  2. Symmetric volume loss of the cerebral hemispheres.  3. Periventricular Microangiopathy.  4. No posttraumatic abnormality.    This report has been produced using speech recognition.  This exam is available in DICOM format to non-affiliated healthcare  facilities on a secure media free searchable basis with prior patient  authorization. The patient exposure is reported to a radiation dose  index registry. All CT examinations are performed with one or more of  the following dose  reduction techniques: Automated Exposure Control,  Adjustment of mA and/or KV according to patient size, or use of  iterative reconstruction techniques.    MACRO:  None    Signed by: Carlito Burdick 12/14/2023 7:26 PM  Dictation workstation:   [id number]

## 2023-12-15 NOTE — CARE PLAN
"Pt is a readmission; she was here 3 weeks ago. Pt resides at [facility], she went to [facility] from here, now she is here for left wall hematoma.  Met with pts son Rommel ([phone]) who said that his mother is a bed hold at [facility] and they just called him asking him if they still need the bed.  Per Rommel, his mother will return to [facility], then move to Assisted Living from Independent Living.  10:27 notified Dr. Castillo via [facility] asking her if this pt is ready for discharge.  10:28 per Dr. Castillo the pt is \"not even close\" to being discharged  10:36 Referral sent to [facility].  10:40 Spoke with leatha Carney, he will keep the bed at [facility] for a couple of more days then re-assess. Per Rommel, his mother is actually going LTC at [facility]; he didn't want to say LTC in front of his mother    DISCHARGE PLAN: RETURN TO [facility] FOR SKILLED--DO NOT DISCHARGE PATIENT BEFORE SPEAKING WITH CARE COORDINATION; NEED TO MAKE SURE THAT [facility] IS READY AND ABLE TO TAKE HER BACK    "

## 2023-12-15 NOTE — PROGRESS NOTES
Ina Mcgowan is a 89 y.o. female on day 1 of admission presenting with Hematoma of left chest wall, initial encounter.      Subjective   Patient seen and examined. Awake/alert/oriented. Denies chest pain, shortness of breath, nausea, or vomiting. No abdominal discomfort. Denies lightheadedness or dizziness. Received blood overnight for a hemoglobin of 5.2, blood pressure currently stable. Hemoglobin increased to 8.2. Hematoma to left back worse, will reach out to general surgery.          Objective     Last Recorded Vitals  /54   Pulse 70   Temp 36.4 °C (97.6 °F) (Oral)   Resp 13   Wt 68.5 kg (151 lb)   SpO2 98%   Intake/Output last 3 Shifts:    Intake/Output Summary (Last 24 hours) at 12/15/2023 0838  Last data filed at 12/15/2023 0450  Gross per 24 hour   Intake 1700 ml   Output --   Net 1700 ml       Admission Weight  Weight: 68.5 kg (151 lb) (12/14/23 2048)    Daily Weight  12/14/23 : 68.5 kg (151 lb)    Image Results  CT head wo IV contrast  Narrative: Interpreted By:  Carlito Burdick,   STUDY:  CT HEAD WO IV CONTRAST; 12/14/2023 6:09 pm      INDICATION:  Signs/Symptoms:unknown injury to back, on coumadin, possible fall.      COMPARISON:  None      ACCESSION NUMBER(S):  [id number]      ORDERING CLINICIAN:  MARTINA FRIAS      TECHNIQUE:  CT was performed with one or more of the following dose reduction  techniques: automated exposure control, adjustment of the mA and/or  kV according to patient size, or use of iterative reconstruction  technique.              PROCEDURE: 3.0 mm axial images were obtained through the brain, to  include the posterior fossa without intravenous contrast enhancement.      FINDINGS:  There is symmetric volume loss of the cerebral hemispheres. Advanced,  significant decreased attenuation in the bilateral periventricular  white matter, consistent with microangiopathy is noted.  There is  accentuation of the lateral ventricles and 3rd and 4th ventricle and  the other CSF  spaces due to the symmetric volume loss of the cerebral  more than cerebellar hemispheres. Brainstem is unremarkable.  Cerebellar hemispheres are symmetric. No intra- or extra-axial mass  or abnormal blood products are demonstrated.      Mild ethmoid mucosal thickening is seen and scattered. Mild inferior  left maxillary mucosal thickening demonstrated in un subcentimeter  mucous retention cyst right sphenoid sinus is seen. Mastoids are  clear. Calvarium and scalp are unremarkable. No posttraumatic  abnormality demonstrated.      Impression: 1. No acute intracranial findings.  2. Symmetric volume loss of the cerebral hemispheres.  3. Periventricular Microangiopathy.  4. No posttraumatic abnormality.      This report has been produced using speech recognition.  This exam is available in DICOM format to non-affiliated healthcare  facilities on a secure media free searchable basis with prior patient  authorization. The patient exposure is reported to a radiation dose  index registry. All CT examinations are performed with one or more of  the following dose reduction techniques: Automated Exposure Control,  Adjustment of mA and/or KV according to patient size, or use of  iterative reconstruction techniques.      MACRO:  None      Signed by: Carlito Burdick 12/14/2023 7:26 PM  Dictation workstation:   [id number]  CT chest abdomen pelvis w IV contrast  Narrative: Interpreted By:  Sergio Grant,   STUDY:  CT CHEST ABDOMEN PELVIS W IV CONTRAST; 12/14/2023 11:25 am      INDICATION:  Signs/Symptoms:trauma.  Bruising on right side of trunk      COMPARISON:  None..      ACCESSION NUMBER(S):  [id number]      ORDERING CLINICIAN:  SASHA BIRMINGHAM      TECHNIQUE:  CT axial images through the chest, abdomen, and pelvis with  intravenous contrast, 75 ml of Omnipaque 350 was administered.   Post  processing sagittal and coronal reconstruction images were also  performed.      All CT examinations are performed with 1 or more of the  following  dose reduction techniques: Automated exposure control, adjustment of  mA and/or kv according to patient's size, or use of iterative  reconstruction techniques.      FINDINGS:  Chest:  Lung and Large Airways: within normal limits.  Pleura: Small right-sided pleural effusion is noted  Vessels: within normal limits.  Heart: Normal size. No pericardial effusion.  Mediastinum and Eileen: within normal limits.  Chest Wall and Lower Neck: Large heterogeneously high attenuation  collection is noted in this soft tissues overlying the posterolateral  left hemithorax. It measures at least 8 by 10 cm      Abdomen:  Liver: within normal limits.  Bile Ducts: Pneumobilia is noted  Gallbladder: Surgical clips are noted in the gallbladder fossa  Pancreas: within normal limits.  Spleen: within normal limits.  Adrenals: within normal limits.  Kidneys: within normal limits.      Pelvis:  No pelvic masses.  Ureters: within normal limits.  Bladder: within normal limits.      Bowel: There is extensive diverticulosis of the sigmoid and  descending colon. No evidence for diverticulitis is noted. Mesenteric  Lymph Nodes: No enlarged mesenteric lymph nodes. Peritoneum: No  ascites or free air, no fluid collection. Vessels: within normal  limits. Retroperitoneum: within normal limits.  Abdominal Wall: within normal limits.  Bones: There is scoliotic deformity of the lumbar spine concave to  the right. 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      Impression: Findings suspicious for soft tissue hematoma in the subcutaneous soft  tissues of the posterolateral left chest      MACRO:  none      Signed by: Sergio Grant 12/14/2023 11:56 AM  Dictation workstation:   [id number]      Physical Exam  Vitals reviewed.   Constitutional:       Comments: Elderly   HENT:      Head: Normocephalic and atraumatic.   Eyes:      Extraocular Movements: Extraocular movements intact.      Conjunctiva/sclera: Conjunctivae  normal.   Cardiovascular:      Rate and Rhythm: Normal rate and regular rhythm.   Pulmonary:      Effort: Pulmonary effort is normal.      Breath sounds: Normal breath sounds. No wheezing, rhonchi or rales.   Abdominal:      General: Bowel sounds are normal.      Palpations: Abdomen is soft.      Tenderness: There is no abdominal tenderness.   Musculoskeletal:         General: Normal range of motion.   Skin:     General: Skin is warm and dry.      Capillary Refill: Capillary refill takes less than 2 seconds.      Findings: Bruising present.      Comments: Large hematoma to left chest, side, around to left back   Neurological:      General: No focal deficit present.      Mental Status: She is alert and oriented to person, place, and time.   Psychiatric:         Mood and Affect: Mood normal.         Behavior: Behavior normal.         Relevant Results  Lab Results   Component Value Date    WBC 15.7 (H) 12/15/2023    HGB 8.2 (L) 12/15/2023    HGB 8.2 (L) 12/15/2023    HCT 25.1 (L) 12/15/2023    HCT 25.1 (L) 12/15/2023    MCV 93 12/15/2023     12/15/2023     Lab Results   Component Value Date    GLUCOSE 121 (H) 12/15/2023    CALCIUM 7.8 (L) 12/15/2023     12/15/2023    K 4.2 12/15/2023    CO2 20 (L) 12/15/2023     (H) 12/15/2023    BUN 54 (H) 12/15/2023    CREATININE 1.40 12/15/2023            Assessment/Plan      Principal Problem:    Hematoma of left chest wall, initial encounter    Hematoma left chest, side, back  Noted large hematoma, unsure if there was a fall or injury, patient does not recall  INR elevated at 4.5, H/H 7.7/24.7  Given Vitamin K and FFP in ED  Trend H/H  Monitor hematoma close, worse today, discussed with general surgery NP  Received 2 unit PRBC overnight for hemoglobin 8.2, repeat hemoglobin 8.2  CT head without acute process  Consult general surgery, appreciate recommendations     Supratherapeutic INR  INR 4.5, given Vitamin K and FFP in ED, decreased to 1.2  Hold Coumadin,  takes for A fib  Daily INR     Atrial Fibrillation  Monitor on tele  Hold metoprolol for now due to bleed/low blood pressure     Hypertension  Hold antihypertensives for now due to active bleed/low blood pressure     Hyperlipidemia  Resume cholesterol medication     Hypothyroidism  Resume home levothyroxine  Check TSH, normal     Acute kidney injury  Given IV fluids, improved, monitor close  Repeat BMP in AM  Consult nephrology if no improvement     Hyperkalemia  Potassium 5.4, repeat, 4.2, monitor     Urinary Tract Infection  UA positive, culture pending  IV ceftriaxone, follow culture     Plan  Admit to SDU  Monitor on tele  Trend H/H  S/p 2 unit PRBC overnight  Monitor blood pressure  Hold antihypertensives  On chronic prednisone, stress dose steroids  Consult general surgery, appreciate recs  DVT prophylaxis: Hold Coumadin  Daily INR, CBC, BMP  Repeat H/H at noon                 Adrienne Vigil, LANRE-CNP

## 2023-12-15 NOTE — PROGRESS NOTES
Physical Therapy                 Therapy Communication Note    Patient Name: Ina Mcgowan  MRN: [id number]  Today's Date: 12/15/2023     Discipline: Physical Therapy    Missed Visit Reason: Missed Visit Reason: Patient placed on medical hold    Missed Time: Cancel    Comment: pt with worsening left flank hematoma and awaiting general surgery consults.

## 2023-12-15 NOTE — PROGRESS NOTES
Occupational Therapy                 Therapy Communication Note    Patient Name: Ina Mcgowan  MRN: [id number]  Today's Date: 12/15/2023     Discipline: Occupational Therapy    Missed Visit Reason: Missed Visit Reason: Cancel    Missed Time: Cancel    Comment: Patient cancelled this date for OT evaluation as with increasing Left flank hematoma, not appropriate this date per nurse and CNP.

## 2023-12-16 LAB
ANION GAP SERPL CALC-SCNC: 10 MMOL/L
BACTERIA UR CULT: NO GROWTH
BUN SERPL-MCNC: 47 MG/DL (ref 8–25)
CALCIUM SERPL-MCNC: 7.8 MG/DL (ref 8.5–10.4)
CHLORIDE SERPL-SCNC: 108 MMOL/L (ref 97–107)
CO2 SERPL-SCNC: 23 MMOL/L (ref 24–31)
CREAT SERPL-MCNC: 1.2 MG/DL (ref 0.4–1.6)
ERYTHROCYTE [DISTWIDTH] IN BLOOD BY AUTOMATED COUNT: 15.8 % (ref 11.5–14.5)
GFR SERPL CREATININE-BSD FRML MDRD: 42 ML/MIN/1.73M*2
GLUCOSE SERPL-MCNC: 138 MG/DL (ref 65–99)
HCT VFR BLD AUTO: 25.3 % (ref 36–46)
HCT VFR BLD AUTO: 25.3 % (ref 36–46)
HCT VFR BLD AUTO: 26.9 % (ref 36–46)
HGB BLD-MCNC: 8.4 G/DL (ref 12–16)
HGB BLD-MCNC: 8.4 G/DL (ref 12–16)
HGB BLD-MCNC: 8.7 G/DL (ref 12–16)
INR PPP: 1.1 (ref 0.9–1.2)
MCH RBC QN AUTO: 30.9 PG (ref 26–34)
MCHC RBC AUTO-ENTMCNC: 33.2 G/DL (ref 32–36)
MCV RBC AUTO: 93 FL (ref 80–100)
NRBC BLD-RTO: 0.9 /100 WBCS (ref 0–0)
PLATELET # BLD AUTO: 254 X10*3/UL (ref 150–450)
POTASSIUM SERPL-SCNC: 4.2 MMOL/L (ref 3.4–5.1)
PROTHROMBIN TIME: 11.8 SECONDS (ref 9.3–12.7)
RBC # BLD AUTO: 2.72 X10*6/UL (ref 4–5.2)
SODIUM SERPL-SCNC: 141 MMOL/L (ref 133–145)
WBC # BLD AUTO: 14.2 X10*3/UL (ref 4.4–11.3)

## 2023-12-16 PROCEDURE — 99222 1ST HOSP IP/OBS MODERATE 55: CPT

## 2023-12-16 PROCEDURE — 80048 BASIC METABOLIC PNL TOTAL CA: CPT | Performed by: NURSE PRACTITIONER

## 2023-12-16 PROCEDURE — 85018 HEMOGLOBIN: CPT | Performed by: NURSE PRACTITIONER

## 2023-12-16 PROCEDURE — 2060000001 HC INTERMEDIATE ICU ROOM DAILY

## 2023-12-16 PROCEDURE — 97162 PT EVAL MOD COMPLEX 30 MIN: CPT | Mod: GP

## 2023-12-16 PROCEDURE — 36415 COLL VENOUS BLD VENIPUNCTURE: CPT | Performed by: NURSE PRACTITIONER

## 2023-12-16 PROCEDURE — 2500000004 HC RX 250 GENERAL PHARMACY W/ HCPCS (ALT 636 FOR OP/ED): Performed by: INTERNAL MEDICINE

## 2023-12-16 PROCEDURE — 85027 COMPLETE CBC AUTOMATED: CPT | Performed by: NURSE PRACTITIONER

## 2023-12-16 PROCEDURE — 97530 THERAPEUTIC ACTIVITIES: CPT | Mod: GP

## 2023-12-16 PROCEDURE — 85610 PROTHROMBIN TIME: CPT | Performed by: NURSE PRACTITIONER

## 2023-12-16 PROCEDURE — 2500000001 HC RX 250 WO HCPCS SELF ADMINISTERED DRUGS (ALT 637 FOR MEDICARE OP): Performed by: NURSE PRACTITIONER

## 2023-12-16 PROCEDURE — 99231 SBSQ HOSP IP/OBS SF/LOW 25: CPT | Performed by: STUDENT IN AN ORGANIZED HEALTH CARE EDUCATION/TRAINING PROGRAM

## 2023-12-16 PROCEDURE — 2500000004 HC RX 250 GENERAL PHARMACY W/ HCPCS (ALT 636 FOR OP/ED): Performed by: NURSE PRACTITIONER

## 2023-12-16 PROCEDURE — 97163 PT EVAL HIGH COMPLEX 45 MIN: CPT | Mod: GP

## 2023-12-16 RX ADMIN — LATANOPROST 1 DROP: 50 SOLUTION OPHTHALMIC at 22:07

## 2023-12-16 RX ADMIN — GABAPENTIN 100 MG: 100 CAPSULE ORAL at 15:47

## 2023-12-16 RX ADMIN — POLYETHYLENE GLYCOL 3350 17 G: 17 POWDER, FOR SOLUTION ORAL at 08:53

## 2023-12-16 RX ADMIN — GABAPENTIN 100 MG: 100 CAPSULE ORAL at 08:53

## 2023-12-16 RX ADMIN — CEFTRIAXONE SODIUM 1 G: 1 INJECTION, SOLUTION INTRAVENOUS at 11:20

## 2023-12-16 RX ADMIN — COLESTIPOL HYDROCHLORIDE 2 G: 1 TABLET ORAL at 22:04

## 2023-12-16 RX ADMIN — GABAPENTIN 100 MG: 100 CAPSULE ORAL at 22:04

## 2023-12-16 RX ADMIN — LEVOTHYROXINE SODIUM 100 MCG: 0.1 TABLET ORAL at 06:17

## 2023-12-16 RX ADMIN — HYDROCORTISONE SODIUM SUCCINATE 50 MG: 100 INJECTION, POWDER, FOR SOLUTION INTRAMUSCULAR; INTRAVENOUS at 22:04

## 2023-12-16 RX ADMIN — HYDROCORTISONE SODIUM SUCCINATE 50 MG: 100 INJECTION, POWDER, FOR SOLUTION INTRAMUSCULAR; INTRAVENOUS at 11:20

## 2023-12-16 ASSESSMENT — COGNITIVE AND FUNCTIONAL STATUS - GENERAL
TURNING FROM BACK TO SIDE WHILE IN FLAT BAD: A LOT
DRESSING REGULAR LOWER BODY CLOTHING: A LITTLE
STANDING UP FROM CHAIR USING ARMS: A LITTLE
WALKING IN HOSPITAL ROOM: A LITTLE
PERSONAL GROOMING: A LITTLE
MOVING TO AND FROM BED TO CHAIR: A LOT
WALKING IN HOSPITAL ROOM: A LOT
CLIMB 3 TO 5 STEPS WITH RAILING: TOTAL
CLIMB 3 TO 5 STEPS WITH RAILING: A LITTLE
STANDING UP FROM CHAIR USING ARMS: A LOT
TOILETING: A LITTLE
HELP NEEDED FOR BATHING: A LOT
MOVING FROM LYING ON BACK TO SITTING ON SIDE OF FLAT BED WITH BEDRAILS: A LITTLE
DAILY ACTIVITIY SCORE: 18
TURNING FROM BACK TO SIDE WHILE IN FLAT BAD: A LITTLE
MOVING FROM LYING ON BACK TO SITTING ON SIDE OF FLAT BED WITH BEDRAILS: A LITTLE
MOBILITY SCORE: 12
MOBILITY SCORE: 18
DRESSING REGULAR UPPER BODY CLOTHING: A LITTLE
MOVING TO AND FROM BED TO CHAIR: A LITTLE

## 2023-12-16 ASSESSMENT — ACTIVITIES OF DAILY LIVING (ADL)
ADL_ASSISTANCE: NEEDS ASSISTANCE
LACK_OF_TRANSPORTATION: NO

## 2023-12-16 ASSESSMENT — PAIN - FUNCTIONAL ASSESSMENT
PAIN_FUNCTIONAL_ASSESSMENT: 0-10
PAIN_FUNCTIONAL_ASSESSMENT: 0-10
PAIN_FUNCTIONAL_ASSESSMENT: FLACC (FACE, LEGS, ACTIVITY, CRY, CONSOLABILITY)

## 2023-12-16 ASSESSMENT — PAIN SCALES - GENERAL
PAINLEVEL_OUTOF10: 3
PAINLEVEL_OUTOF10: 0 - NO PAIN

## 2023-12-16 NOTE — ED NOTES
INONTINENCE CARE PERFORMED PATIENT IS CLEAN AND DRY UPON TRANSPORT     Lali Gamble LPN  12/15/23 0225

## 2023-12-16 NOTE — PROGRESS NOTES
Ina Mcgowan is a 89 y.o. female on day 2 of admission presenting with Hematoma of left chest wall, initial encounter.      Subjective   Patient seen and examined. Awake/alert/oriented. Sitting up in a chair. Reports mild pain and tenderness to left chest and back. Hematoma appears somewhat improved however a bit more protrusion to left back. Reports feeling a little lightheaded when she got up with therapy for the first time, no further episodes. Denies shortness of breath, fevers, chills, nausea, or vomiting. No abdominal discomfort.          Objective     Last Recorded Vitals  BP (!) 101/47 (BP Location: Right arm, Patient Position: Sitting)   Pulse 96   Temp 36.2 °C (97.2 °F) (Temporal)   Resp 18   Wt 65.8 kg (145 lb 1 oz)   SpO2 98%   Intake/Output last 3 Shifts:    Intake/Output Summary (Last 24 hours) at 12/16/2023 1132  Last data filed at 12/16/2023 0600  Gross per 24 hour   Intake 390 ml   Output --   Net 390 ml         Admission Weight  Weight: 68.5 kg (151 lb) (12/14/23 2048)    Daily Weight  12/16/23 : 65.8 kg (145 lb 1 oz)    Image Results  CT head wo IV contrast  Narrative: Interpreted By:  Carlito Burdick,   STUDY:  CT HEAD WO IV CONTRAST; 12/14/2023 6:09 pm      INDICATION:  Signs/Symptoms:unknown injury to back, on coumadin, possible fall.      COMPARISON:  None      ACCESSION NUMBER(S):  [id number]      ORDERING CLINICIAN:  MARTINA FRIAS      TECHNIQUE:  CT was performed with one or more of the following dose reduction  techniques: automated exposure control, adjustment of the mA and/or  kV according to patient size, or use of iterative reconstruction  technique.              PROCEDURE: 3.0 mm axial images were obtained through the brain, to  include the posterior fossa without intravenous contrast enhancement.      FINDINGS:  There is symmetric volume loss of the cerebral hemispheres. Advanced,  significant decreased attenuation in the bilateral periventricular  white matter, consistent  with microangiopathy is noted.  There is  accentuation of the lateral ventricles and 3rd and 4th ventricle and  the other CSF spaces due to the symmetric volume loss of the cerebral  more than cerebellar hemispheres. Brainstem is unremarkable.  Cerebellar hemispheres are symmetric. No intra- or extra-axial mass  or abnormal blood products are demonstrated.      Mild ethmoid mucosal thickening is seen and scattered. Mild inferior  left maxillary mucosal thickening demonstrated in un subcentimeter  mucous retention cyst right sphenoid sinus is seen. Mastoids are  clear. Calvarium and scalp are unremarkable. No posttraumatic  abnormality demonstrated.      Impression: 1. No acute intracranial findings.  2. Symmetric volume loss of the cerebral hemispheres.  3. Periventricular Microangiopathy.  4. No posttraumatic abnormality.      This report has been produced using speech recognition.  This exam is available in DICOM format to non-affiliated healthcare  facilities on a secure media free searchable basis with prior patient  authorization. The patient exposure is reported to a radiation dose  index registry. All CT examinations are performed with one or more of  the following dose reduction techniques: Automated Exposure Control,  Adjustment of mA and/or KV according to patient size, or use of  iterative reconstruction techniques.      MACRO:  None      Signed by: Carlito Burdick 12/14/2023 7:26 PM  Dictation workstation:   [id number]  CT chest abdomen pelvis w IV contrast  Narrative: Interpreted By:  Sergio Grant,   STUDY:  CT CHEST ABDOMEN PELVIS W IV CONTRAST; 12/14/2023 11:25 am      INDICATION:  Signs/Symptoms:trauma.  Bruising on right side of trunk      COMPARISON:  None..      ACCESSION NUMBER(S):  [id number]      ORDERING CLINICIAN:  SASHA BIRMINGHAM      TECHNIQUE:  CT axial images through the chest, abdomen, and pelvis with  intravenous contrast, 75 ml of Omnipaque 350 was administered.   Post  processing  sagittal and coronal reconstruction images were also  performed.      All CT examinations are performed with 1 or more of the following  dose reduction techniques: Automated exposure control, adjustment of  mA and/or kv according to patient's size, or use of iterative  reconstruction techniques.      FINDINGS:  Chest:  Lung and Large Airways: within normal limits.  Pleura: Small right-sided pleural effusion is noted  Vessels: within normal limits.  Heart: Normal size. No pericardial effusion.  Mediastinum and Eileen: within normal limits.  Chest Wall and Lower Neck: Large heterogeneously high attenuation  collection is noted in this soft tissues overlying the posterolateral  left hemithorax. It measures at least 8 by 10 cm      Abdomen:  Liver: within normal limits.  Bile Ducts: Pneumobilia is noted  Gallbladder: Surgical clips are noted in the gallbladder fossa  Pancreas: within normal limits.  Spleen: within normal limits.  Adrenals: within normal limits.  Kidneys: within normal limits.      Pelvis:  No pelvic masses.  Ureters: within normal limits.  Bladder: within normal limits.      Bowel: There is extensive diverticulosis of the sigmoid and  descending colon. No evidence for diverticulitis is noted. Mesenteric  Lymph Nodes: No enlarged mesenteric lymph nodes. Peritoneum: No  ascites or free air, no fluid collection. Vessels: within normal  limits. Retroperitoneum: within normal limits.  Abdominal Wall: within normal limits.  Bones: There is scoliotic deformity of the lumbar spine concave to  the right. 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      Impression: Findings suspicious for soft tissue hematoma in the subcutaneous soft  tissues of the posterolateral left chest      MACRO:  none      Signed by: Sergio Grant 12/14/2023 11:56 AM  Dictation workstation:   [id number]      Physical Exam  Vitals reviewed.   Constitutional:       Comments: Elderly   HENT:      Head:  Normocephalic and atraumatic.   Eyes:      Extraocular Movements: Extraocular movements intact.      Conjunctiva/sclera: Conjunctivae normal.   Cardiovascular:      Rate and Rhythm: Normal rate and regular rhythm.   Pulmonary:      Effort: Pulmonary effort is normal.      Breath sounds: Normal breath sounds. No wheezing, rhonchi or rales.   Abdominal:      General: Bowel sounds are normal.      Palpations: Abdomen is soft.      Tenderness: There is no abdominal tenderness.   Musculoskeletal:         General: Normal range of motion.   Skin:     General: Skin is warm and dry.      Capillary Refill: Capillary refill takes less than 2 seconds.      Findings: Bruising present.      Comments: Large hematoma to left chest, side, around to left back   Neurological:      General: No focal deficit present.      Mental Status: She is alert and oriented to person, place, and time.   Psychiatric:         Mood and Affect: Mood normal.         Behavior: Behavior normal.         Relevant Results  Lab Results   Component Value Date    WBC 14.2 (H) 12/16/2023    HGB 8.4 (L) 12/16/2023    HGB 8.4 (L) 12/16/2023    HCT 25.3 (L) 12/16/2023    HCT 25.3 (L) 12/16/2023    MCV 93 12/16/2023     12/16/2023     Lab Results   Component Value Date    GLUCOSE 138 (H) 12/16/2023    CALCIUM 7.8 (L) 12/16/2023     12/16/2023    K 4.2 12/16/2023    CO2 23 (L) 12/16/2023     (H) 12/16/2023    BUN 47 (H) 12/16/2023    CREATININE 1.20 12/16/2023            Assessment/Plan      Principal Problem:    Hematoma of left chest wall, initial encounter    Hematoma left chest, side, back  Noted large hematoma, unsure if there was a fall or injury, patient does not recall  INR elevated at 4.5, H/H 7.7/24.7 on admit  Given Vitamin K and FFP in ED  Trend H/H  Monitor hematoma close  Received 2 unit PRBC   CT head without acute process  Consult general surgery, appreciate recommendations     Supratherapeutic INR  INR 4.5, given Vitamin K and FFP  in ED, decreased to 1.2, 1.1  Hold Coumadin, takes for A fib  Daily INR  Consult cardiology, apprecaite recommendation     Atrial Fibrillation  Monitor on tele  Hold metoprolol for now due to bleed/low blood pressure     Hypertension  Hold antihypertensives for now due to hematoma/low blood pressure     Hyperlipidemia  Resume cholesterol medication     Hypothyroidism  Resume home levothyroxine  Check TSH, normal     Acute kidney injury  Given IV fluids, improved, monitor close  Repeat BMP in AM     Hyperkalemia  Potassium 5.4, repeat, 4.2, monitor     Urinary Tract Infection  UA positive, culture pending  IV ceftriaxone, follow culture, no growth, final , will stop ATB     Plan  Admit to SDU  Monitor on tele  Trend H/H  S/p 2 unit PRBC   Monitor blood pressure  Hold antihypertensives  On chronic prednisone, stress dose steroids  Consult general surgery, appreciate recs  DVT prophylaxis: Hold Coumadin  Consult cardiology, appreciate recs  Daily INR, CBC, BMP  PT/OT recommending moderate intensity rehab. Patient was at [facility] for SNF, lived in independent living, plan is to transition to assisted living when she completes rehab.     Patient is a DNRCCA DNI                 Adrienne Vigil, APRN-CNP

## 2023-12-16 NOTE — PROGRESS NOTES
Physical Therapy    Physical Therapy Evaluation & Treatment    Patient Name: Ina Mcgowan  MRN: [id number]  Today's Date: 12/16/2023   Time Calculation  Start Time: 0950  Stop Time: 1015  Time Calculation (min): 25 min    Assessment/Plan   PT Assessment  PT Assessment Results: Decreased strength, Decreased range of motion, Decreased endurance, Impaired balance, Decreased mobility, Decreased coordination, Decreased cognition, Impaired judgement, Decreased safety awareness, Impaired vision, Impaired hearing, Decreased skin integrity, Pain  Rehab Prognosis: Good  Evaluation/Treatment Tolerance: Patient tolerated treatment well, Patient limited by fatigue  Medical Staff Made Aware: Yes  Strengths: Ability to acquire knowledge  Barriers to Participation: Comorbidities  End of Session Communication: Bedside nurse  Assessment Comment: The patient currently requires modA to maxA for transfers and bedside activity with RW. The patient has experienced a functional decline and would continue to benefit from skilled therapy services to address functional deficits. Pt would benefit from moderate intensity rehab services on DC.  End of Session Patient Position: Up in chair   IP OR SWING BED PT PLAN  Inpatient or Swing Bed: Inpatient  PT Plan  Treatment/Interventions: Bed mobility, Transfer training, Gait training, Balance training, Neuromuscular re-education, Strengthening, Endurance training, Range of motion, Therapeutic exercise, Therapeutic activity, Home exercise program  PT Plan: Skilled PT  PT Frequency: 5 times per week  PT Discharge Recommendations: Moderate intensity level of continued care  Equipment Recommended upon Discharge: Wheeled walker  PT Recommended Transfer Status: Assist x1  PT - OK to Discharge: Yes (to next appropriate level of care)      Subjective     General Visit Information:  General  Reason for Referral: mobility impairment  Referred By: LANRE Moon-CNP  Past Medical History Relevant to  Rehab: afib, HLD, HTN, hypothyroidism, OA  Prior to Session Communication: Bedside nurse  Patient Position Received: Bed, 3 rail up  Preferred Learning Style: verbal, visual  General Comment: The patient is a 89 y.o. female admitted to the hospital for Lt chest wall hematoma.  Home Living:  Home Living  Type of Home: Apartment  Lives With: Alone  Home Adaptive Equipment: Walker rolling or standard (rollator)  Home Layout: One level  Home Access: Level entry  Bathroom Shower/Tub: Walk-in shower  Bathroom Toilet: Standard  Bathroom Equipment: Grab bars in shower  Home Living Comments: Pt lives at [facility]  Prior Level of Function:  Prior Function Per Pt/Caregiver Report  Level of [place]: Independent with ADLs and functional transfers, Needs assistance with homemaking  Receives Help From: Other (Comment) (aide prn)  ADL Assistance: Needs assistance (assist for showers)  Homemaking Assistance: Needs assistance (children complete laundry; cleaning service; meals provided by facility; children complete transportation)  Ambulatory Assistance: Independent (with use of RW vs rollator)  Vocational: Retired  Leisure: enjoys activities at [place]  Prior Function Comments: Pt denies h/o recent falls.  Precautions:  Precautions  Hearing/Visual Limitations: h/o glasses, mild [place]  Medical Precautions: Fall precautions  Precautions Comment: Lt chest wall hematoma  Vital Signs:  Vital Signs  Heart Rate: 73 (up to 95 bpm with activity)  Heart Rate Source: Monitor    Objective   Pain:  Pain Assessment  Pain Assessment: 0-10  Pain Score: 3  Pain Type: Acute pain  Pain Location: Shoulder  Pain Orientation: Left  Cognition:  Cognition  Overall Cognitive Status: Within Functional Limits  Orientation Level: Disoriented to place ([facility]; [facility])    General Assessments:  General Observation  General Observation: Pt very pleasant; reports fatigue with mobility to bedside chair. Pt on room air with  telemetry donned.    Activity Tolerance  Endurance: Decreased tolerance for upright activites    Sensation  Light Touch: No apparent deficits  Sharp/Dull: No apparent deficits    Coordination  Coordination Comment: Increased time for mobility completion    Postural Control  Posture Comment: Kyphotic posture    Static Sitting Balance  Static Sitting-Balance Support: Bilateral upper extremity supported  Static Sitting-Level of Assistance: Close supervision  Static Sitting-Comment/Number of Minutes: Pt sat EOB for >6 min with supervision and forward flexed posture.    Dynamic Standing Balance  Dynamic Standing-Balance Support: Bilateral upper extremity supported (RW)  Dynamic Standing-Balance: Forward lean  Dynamic Standing-Comments: ModA for ambulation to bedside chair with RW; kyphotic posture with increased lateral sway.  Functional Assessments:  Bed Mobility  Bed Mobility: Yes  Bed Mobility 1  Bed Mobility 1: Supine to sitting  Level of Assistance 1: Maximum assistance  Bed Mobility Comments 1: Assist with trunk elevation and moving BLE to EOB; maxA for scooting hips to EOB with draw sheet.    Transfers  Transfer: Yes  Transfer 1  Transfer From 1: Bed to, Stand to  Transfer to 1: Stand, Chair with arms  Technique 1: Sit to stand, Stand to sit  Transfer Device 1: Walker  Transfer Level of Assistance 1: Moderate assistance  Trials/Comments 1: VC for safe sequencing; increased time and effort.    Ambulation/Gait Training  Ambulation/Gait Training Performed: Yes  Ambulation/Gait Training 1  Surface 1: Level tile  Device 1: Rolling walker  Assistance 1: Moderate assistance  Quality of Gait 1: Narrow base of support  Comments/Distance (ft) 1: Ambulation of 4ftx1 with RW and modA. Shuffled-like gait with minimal toe clearance and narrow CECELIA. Severe arthritic changes in BLE.  Extremity/Trunk Assessments:  RLE   RLE : Exceptions to WFL (grossly 3-3+/5)  LLE   LLE : Exceptions to WFL (grossly 3-3+/5)  Treatments:  Bed  Mobility  Bed Mobility: Yes  Bed Mobility 1  Bed Mobility 1: Supine to sitting  Level of Assistance 1: Maximum assistance  Bed Mobility Comments 1: Assist with trunk elevation and moving BLE to EOB; maxA for scooting hips to EOB with draw sheet.    Ambulation/Gait Training  Ambulation/Gait Training Performed: Yes  Ambulation/Gait Training 1  Surface 1: Level tile  Device 1: Rolling walker  Assistance 1: Moderate assistance  Quality of Gait 1: Narrow base of support  Comments/Distance (ft) 1: Ambulation of 4ftx1 with RW and modA. Shuffled-like gait with minimal toe clearance and narrow CECELIA. Severe arthritic changes in BLE.  Transfers  Transfer: Yes  Transfer 1  Transfer From 1: Bed to, Stand to  Transfer to 1: Stand, Chair with arms  Technique 1: Sit to stand, Stand to sit  Transfer Device 1: Walker  Transfer Level of Assistance 1: Moderate assistance  Trials/Comments 1: VC for safe sequencing; increased time and effort.  Outcome Measures:  [facility] Basic Mobility  Turning from your back to your side while in a flat bed without using bedrails: A little  Moving from lying on your back to sitting on the side of a flat bed without using bedrails: A lot  Moving to and from bed to chair (including a wheelchair): A lot  Standing up from a chair using your arms (e.g. wheelchair or bedside chair): A lot  To walk in hospital room: A lot  Climbing 3-5 steps with railing: Total  Basic Mobility - Total Score: 12    Encounter Problems       Encounter Problems (Active)       PT Problem       The patient will demonstrate an overall strength of 4/5 in BLE to assist with completion of functional mobility.   (Progressing)       Start:  12/16/23    Expected End:  01/16/24            The patient will complete functional mobility (bed mobility, transfers, etc.) at a supervision level with RW by ANJELICA.   (Progressing)       Start:  12/16/23    Expected End:  01/16/24            The patient will be able to ambulate at a supervision level for  30ftx1 with RW.  (Progressing)       Start:  12/16/23    Expected End:  01/16/24            The patient will be able to participate in functional mobility with acceptable pain level of 1/10 as reported by patient.  (Progressing)       Start:  12/16/23    Expected End:  01/16/24                   Education Documentation  Precautions, taught by Karen Haas PT at 12/16/2023 11:22 AM.  Learner: Patient  Readiness: Acceptance  Method: Explanation  Response: Verbalizes Understanding    Mobility Training, taught by Karen Haas PT at 12/16/2023 11:22 AM.  Learner: Patient  Readiness: Acceptance  Method: Explanation  Response: Verbalizes Understanding    Education Comments  No comments found.

## 2023-12-16 NOTE — PROGRESS NOTES
"Ina Mcgowan is a 89 y.o. female on day 2 of admission presenting with Hematoma of left chest wall, initial encounter.    Subjective   Doing well overnight.  Had a small breakfast this morning. Complains of pain with movement.       Objective     Physical Exam  Vitals and nursing note reviewed.   Constitutional:       Appearance: Normal appearance.   HENT:      Head: Normocephalic and atraumatic.      Mouth/Throat:      Mouth: Mucous membranes are moist.      Pharynx: Oropharynx is clear.   Eyes:      Extraocular Movements: Extraocular movements intact.      Pupils: Pupils are equal, round, and reactive to light.   Cardiovascular:      Rate and Rhythm: Normal rate and regular rhythm.      Pulses: Normal pulses.   Pulmonary:      Effort: Pulmonary effort is normal.      Breath sounds: Normal breath sounds.   Abdominal:      General: There is no distension.      Palpations: Abdomen is soft.      Tenderness: There is no abdominal tenderness.   Musculoskeletal:      Cervical back: Normal range of motion and neck supple.   Skin:     General: Skin is warm and dry.      Comments: Large ecchymoses over left side from shoulder down through buttock and tracking medially to the right across the back. Stable size hematoma in inferior left axilla   Neurological:      General: No focal deficit present.      Mental Status: She is alert and oriented to person, place, and time.   Psychiatric:         Mood and Affect: Mood normal.         Behavior: Behavior normal.         Last Recorded Vitals  Blood pressure (!) 101/47, pulse 96, temperature 36.2 °C (97.2 °F), temperature source Temporal, resp. rate 18, height 1.575 m (5' 2\"), weight 65.8 kg (145 lb 1 oz), SpO2 98 %.  Intake/Output last 3 Shifts:  I/O last 3 completed shifts:  In: 2090 (31.8 mL/kg) [P.O.:340; Blood:1700; IV Piggyback:50]  Out: - (0 mL/kg)   Weight: 65.8 kg     Relevant Results  Results for orders placed or performed during the hospital encounter of 12/14/23 " (from the past 24 hour(s))   Hemoglobin and hematocrit, blood   Result Value Ref Range    Hemoglobin 8.7 (L) 12.0 - 16.0 g/dL    Hematocrit 26.2 (L) 36.0 - 46.0 %   Hemoglobin and hematocrit, blood   Result Value Ref Range    Hemoglobin 8.2 (L) 12.0 - 16.0 g/dL    Hematocrit 24.0 (L) 36.0 - 46.0 %   Hemoglobin and hematocrit, blood   Result Value Ref Range    Hemoglobin 8.4 (L) 12.0 - 16.0 g/dL    Hematocrit 25.3 (L) 36.0 - 46.0 %   Hemoglobin and hematocrit, blood   Result Value Ref Range    Hemoglobin 8.4 (L) 12.0 - 16.0 g/dL    Hematocrit 25.3 (L) 36.0 - 46.0 %   Protime-INR   Result Value Ref Range    Protime 11.8 9.3 - 12.7 seconds    INR 1.1 0.9 - 1.2   Basic Metabolic Panel   Result Value Ref Range    Glucose 138 (H) 65 - 99 mg/dL    Sodium 141 133 - 145 mmol/L    Potassium 4.2 3.4 - 5.1 mmol/L    Chloride 108 (H) 97 - 107 mmol/L    Bicarbonate 23 (L) 24 - 31 mmol/L    Urea Nitrogen 47 (H) 8 - 25 mg/dL    Creatinine 1.20 0.40 - 1.60 mg/dL    eGFR 42 (L) >60 mL/min/1.73m*2    Calcium 7.8 (L) 8.5 - 10.4 mg/dL    Anion Gap 10 <=19 mmol/L   CBC   Result Value Ref Range    WBC 14.2 (H) 4.4 - 11.3 x10*3/uL    nRBC 0.9 (H) 0.0 - 0.0 /100 WBCs    RBC 2.72 (L) 4.00 - 5.20 x10*6/uL    Hemoglobin 8.4 (L) 12.0 - 16.0 g/dL    Hematocrit 25.3 (L) 36.0 - 46.0 %    MCV 93 80 - 100 fL    MCH 30.9 26.0 - 34.0 pg    MCHC 33.2 32.0 - 36.0 g/dL    RDW 15.8 (H) 11.5 - 14.5 %    Platelets 254 150 - 450 x10*3/uL         Assessment/Plan   Principal Problem:    Hematoma of left chest wall, initial encounter    Doing well overnight. Hemoglobin has been stable. Tolerating diet. Skin overlying is ecchymotic but viable. No drainage or debridement required. Will cont to follow    Danelle Drummond MD

## 2023-12-16 NOTE — NURSING NOTE
MD Hadley made aware pt unable to take po cholesterol medication due to pt unable to swallow. MD states okay to hold tonight. MD aware pt no DVT prophylaxis due to pt admission for hematoma and no new orders received.

## 2023-12-16 NOTE — CONSULTS
Inpatient consult to Cardiology  Consult performed by: MALLORY Dick  Consult ordered by: MALLORY Moon        History Of Present Illness:    Ina Mcgowan is a 89 y.o. female presenting per nursing facility with left flank bleeding and bruising as well as swelling.  She is unsure of her current cardiologist.  Patient has a past medical history of paroxysmal atrial fibrillation on Coumadin, hypertension, hyperlipidemia, hypothyroidism and osteoarthritis.  Patient denies any recent chest pain, pressure or palpitations.  She denies any nausea or vomiting.  She denies any lightheadedness or syncope.  Patient states that she does not remember falling however her daughter at the bedside states that she may have slipped off the edge of her bed at her nursing facility.  On arrival to the emergency department troponins were elevated but overall flat at 80, 65 and 70.  Her sodium was 135, potassium elevated at 5.4 and hemoglobin of 7.7.  He was also found to have an elevated creatinine of 1.70.  Her INR was elevated at 4.5.  EKG completed in the emergency department showed a normal sinus rhythm with a right bundle branch block.  CT of the head showed no acute intracranial findings.  ET of the abdomen pelvis showed a soft tissue hematoma in the subcutaneous soft tissues of the posterior lateral left chest.  Patient was hypotensive in the emergency department with a blood pressure of 65/43.  The emergency department she was given IV ceftriaxone, fentanyl, vitamin K, IV fluids and fresh frozen plasma and was admitted for further evaluation and treatment.    Review of Systems   All other systems reviewed and are negative.        Last Recorded Vitals:  Vitals:    12/16/23 0328 12/16/23 0721 12/16/23 0950 12/16/23 1115   BP: (!) 116/48 (!) 118/48  (!) 101/47   BP Location: Right arm Right arm  Right arm   Patient Position: Lying Lying  Sitting   Pulse: 72 69 73 96   Resp: 12 16  18   Temp: 36.9 °C (98.4  "°F) 36.5 °C (97.7 °F)  36.2 °C (97.2 °F)   TempSrc: Temporal Temporal  Temporal   SpO2: 99% 96%  98%   Weight: 65.8 kg (145 lb 1 oz)      Height:           Last Labs:  CBC - 12/16/2023: 11:36 AM  14.2 8.7 254    26.9      CMP - 12/16/2023:  6:07 AM  7.8 5.4 40 --- 0.9   _ 3.0 26 83      PTT - 12/14/2023: 10:54 AM  1.1   11.8 46.1     Hemoglobin A1C   Date/Time Value Ref Range Status   11/30/2023 05:50 AM 6.6 (H) See below % Final   04/25/2023 11:15 AM 7.0 (H) 4.3 - 5.6 % Final     Comment:     American Diabetes Association guidelines indicate that patients with HgbA1c in the range 5.7-6.4% are at increased risk for development of diabetes, and intervention by lifestyle modification may be beneficial. HgbA1c greater or equal to 6.5% is considered diagnostic of diabetes.   11/10/2022 01:11 PM 6.3 (H) 4.3 - 5.6 % Final     Comment:     American Diabetes Association guidelines indicate that patients with HgbA1c in the range 5.7-6.4% are at increased risk for development of diabetes, and intervention by lifestyle modification may be beneficial. HgbA1c greater or equal to 6.5% is considered diagnostic of diabetes.      Last I/O:  I/O last 3 completed shifts:  In: 2090 (31.8 mL/kg) [P.O.:340; Blood:1700; IV Piggyback:50]  Out: - (0 mL/kg)   Weight: 65.8 kg     Past Cardiology Tests (Last 3 Years):  EKG:  No results found for this or any previous visit from the past 1095 days.    Echo:  No results found for this or any previous visit from the past 1095 days.    Ejection Fractions:  No results found for: \"EF\"  Cath:  No results found for this or any previous visit from the past 1095 days.    Stress Test:  No results found for this or any previous visit from the past 1095 days.    Cardiac Imaging:  No results found for this or any previous visit from the past 1095 days.      Past Medical History:  She has a past medical history of Atrial fibrillation (CMS/HCC), Hyperglycemia, Hyperlipidemia, Hypertension, Hypothyroidism, " Osteoarthritis, PMR (polymyalgia rheumatica) (CMS/[facility]), and Vitamin D deficiency.    Past Surgical History:  She has a past surgical history that includes Cataract extraction (Bilateral); Colonoscopy (02/11/2009); and Cholecystectomy.      Social History:  She reports that she has never smoked. She has never been exposed to tobacco smoke. She has never used smokeless tobacco. She reports that she does not drink alcohol. No history on file for drug use.    Family History:  Family History   Problem Relation Name Age of Onset    Heart disease Mother      Heart disease Father          Allergies:  Patient has no known allergies.    Inpatient Medications:  Scheduled medications   Medication Dose Route Frequency    [Held by provider] amLODIPine  2.5 mg oral Daily    colestipol  2 g oral BID    gabapentin  100 mg oral TID    hydrocortisone sodium succinate  50 mg intravenous q12h JIN    latanoprost  1 drop Both Eyes Nightly    levothyroxine  100 mcg oral Daily before breakfast    [Held by provider] metoprolol succinate XL  100 mg oral Daily    polyethylene glycol  17 g oral Daily    sodium chloride  1,000 mL intravenous Once     PRN medications   Medication    acetaminophen    Or    acetaminophen    Or    acetaminophen    acetaminophen    Or    acetaminophen    Or    acetaminophen    bisacodyl    HYDROcodone-acetaminophen    ondansetron    Or    ondansetron     Continuous Medications   Medication Dose Last Rate     Outpatient Medications:  Current Outpatient Medications   Medication Instructions    acetaminophen (TYLENOL) 1,000 mg, oral, 2 times daily PRN    amLODIPine (NORVASC) 2.5 mg, oral, Daily    amoxicillin-pot clavulanate (Augmentin) 500-125 mg tablet Take 1 tablet (500 mg) by mouth every 12 hours for 14 days. Do not start before December 1, 2023.    colestipol (COLESTID) 2 g, oral, 2 times daily    doxycycline (VIBRAMYCIN) 100 mg, oral, Every 12 hours scheduled, Take with at least 8 ounces (large glass) of water, do  not lie down for 30 minutes after    gabapentin (NEURONTIN) 100 mg, oral, 3 times daily    hydrALAZINE (APRESOLINE) 10 mg, oral, Daily PRN, For systolic >150    HYDROcodone-acetaminophen (Norco) 5-325 mg tablet 1 tablet, oral, Every 6 hours PRN    latanoprost 0.005 % drops, emulsion 1 drop, ophthalmic (eye), Nightly    levothyroxine (SYNTHROID, LEVOXYL) 100 mcg, oral, Daily before breakfast    metoprolol succinate XL (TOPROL-XL) 100 mg, oral, Daily, Hold for systolic <110, HR<60    polyethylene glycol (GLYCOLAX, MIRALAX) 17 g, oral, Daily    predniSONE (DELTASONE) 5 mg, oral, Daily    saccharomyces boulardii (FLORASTOR) 250 mg, oral, Daily       Physical Exam  Constitutional:       Appearance: She is ill-appearing.   Cardiovascular:      Rate and Rhythm: Normal rate and regular rhythm.   Pulmonary:      Effort: Pulmonary effort is normal.   Abdominal:      Palpations: Abdomen is soft.   Musculoskeletal:      Right lower leg: No edema.      Left lower leg: No edema.   Skin:     Findings: Bruising present.      Comments: Large hematoma noted to left side, chest, around the back   Neurological:      Mental Status: She is alert and oriented to person, place, and time.           Assessment/Plan   Hematoma to left chest, side and back  Supra therapeutic INR  Atrial Fibrillation  Hypertension  Hyperlipidemia    Impression and Plan: 12/16 Patient presented to the emergency department from her nursing facility with a large left-sided hematoma.  Patient denies any recent chest pain, pressure or palpitations.  Patient denies any shortness of breath.  Patient does not recall whether she fell at her facility; however, her daughter who is at the bedside states that originally her mother told her she slipped off the end of her bed.  Arrival to the emergency department troponins were drawn they were elevated but trended overall flat at 80, 65 and 70.  Patient was found to be hypotensive in the emergency department and has remained  with low blood pressures throughout her hospitalization with her last recorded at 101/47.  Would continue to hold her amlodipine and metoprolol.  Patient does have a history of atrial fibrillation though on arrival to the emergency department EKG was completed showing normal sinus rhythm with a right bundle branch block.  She has remained in a sinus rhythm on telemetry without significant ectopy.  Her Coumadin has been held in the setting of her low blood counts and hematoma.  This morning her hemoglobin is 8.7.  Her sodium is 141, potassium 4.2, her creatinine is improved at 1.20.  General surgery is following for management of this patient's hematoma.  Overall it is important to consider the risk versus benefit of keeping this patient on anticoagulation at this point specially given her age, labile INRs, and recent hematoma.  This patient has bled score is quite elevated at 5 making her a 9.1% risk of having a major bleeding event in the next year.  Her [id number] is a 5 giving her 6.7% risk of a stroke event at 1 year follow-up.  I have discussed this with the patient and her daughter and they are both agreeable to discontinuing anticoagulation at this time, understanding the risks.     Peripheral IV 12/15/23 22 G Right;Dorsal Hand (Active)   Site Assessment Clean;Dry;Intact 12/16/23 0851   Dressing Status Clean;Dry;Occlusive 12/16/23 0851   Number of days: 1       Code Status:  DNR and No Intubation    I spent 60 minutes in the professional and overall care of this patient.        Marily Castillo, APRN-CNP

## 2023-12-16 NOTE — CARE PLAN
Problem: Fall/Injury  Goal: Not fall by end of shift  Outcome: Progressing  Goal: Be free from injury by end of the shift  Outcome: Progressing  Goal: Verbalize understanding of personal risk factors for fall in the hospital  Outcome: Progressing  Goal: Verbalize understanding of risk factor reduction measures to prevent injury from fall in the home  Outcome: Progressing  Goal: Use assistive devices by end of the shift  Outcome: Progressing  Goal: Pace activities to prevent fatigue by end of the shift  Outcome: Progressing     Problem: Pain  Goal: My pain/discomfort is manageable  Outcome: Progressing     Problem: Safety  Goal: Patient will be injury free during hospitalization  Outcome: Progressing  Goal: I will remain free of falls  Outcome: Progressing     Problem: Daily Care  Goal: Daily care needs are met  Outcome: Progressing     Problem: Psychosocial Needs  Goal: Demonstrates ability to cope with hospitalization/illness  Outcome: Progressing  Goal: Collaborate with me, my family, and caregiver to identify my specific goals  Outcome: Progressing     Problem: Discharge Barriers  Goal: My discharge needs are met  Outcome: Progressing     Problem: Skin  Goal: Decreased wound size/increased tissue granulation at next dressing change  Outcome: Progressing  Goal: Participates in plan/prevention/treatment measures  Outcome: Progressing  Goal: Prevent/manage excess moisture  Outcome: Progressing  Goal: Prevent/minimize sheer/friction injuries  Outcome: Progressing  Flowsheets (Taken 12/16/2023 1037)  Prevent/minimize sheer/friction injuries:   Complete micro-shifts as needed if patient unable. Adjust patient position to relieve pressure points, not a full turn   HOB 30 degrees or less   Increase activity/out of bed for meals  Goal: Promote/optimize nutrition  Outcome: Progressing  Goal: Promote skin healing  Outcome: Progressing   The patient's goals for the shift include less swelling in arms    The clinical goals  for the shift include safety, pain control    Over the shift, the patient did not make progress toward the following goals. Barriers to progression include none. Recommendations to address these barriers include assist with ADLs.

## 2023-12-16 NOTE — CARE PLAN
The patient's goals for the shift include less swelling in arms    The clinical goals for the shift include control pain      Problem: Fall/Injury  Goal: Not fall by end of shift  Outcome: Progressing  Goal: Be free from injury by end of the shift  Outcome: Progressing  Goal: Verbalize understanding of personal risk factors for fall in the hospital  Outcome: Progressing  Goal: Verbalize understanding of risk factor reduction measures to prevent injury from fall in the home  Outcome: Progressing  Goal: Use assistive devices by end of the shift  Outcome: Progressing  Goal: Pace activities to prevent fatigue by end of the shift  Outcome: Progressing     Problem: Pain  Goal: My pain/discomfort is manageable  Outcome: Progressing     Problem: Safety  Goal: Patient will be injury free during hospitalization  Outcome: Progressing  Goal: I will remain free of falls  Outcome: Progressing     Problem: Daily Care  Goal: Daily care needs are met  Outcome: Progressing     Problem: Psychosocial Needs  Goal: Demonstrates ability to cope with hospitalization/illness  Outcome: Progressing  Goal: Collaborate with me, my family, and caregiver to identify my specific goals  Outcome: Progressing  Flowsheets (Taken 12/15/2023 2300)  Cultural Requests During Hospitalization: no  Spiritual Requests During Hospitalization: no, pt states she is [employer]     Problem: Discharge Barriers  Goal: My discharge needs are met  Outcome: Progressing     Problem: Skin  Goal: Decreased wound size/increased tissue granulation at next dressing change  Outcome: Progressing  Goal: Participates in plan/prevention/treatment measures  Outcome: Progressing  Goal: Prevent/manage excess moisture  Outcome: Progressing  Goal: Prevent/minimize sheer/friction injuries  Outcome: Progressing  Flowsheets (Taken 12/15/2023 2340)  Prevent/minimize sheer/friction injuries: Use pull sheet  Goal: Promote/optimize nutrition  Outcome: Progressing  Goal: Promote skin  healing  Outcome: Progressing

## 2023-12-17 LAB
ANION GAP SERPL CALC-SCNC: 8 MMOL/L
BUN SERPL-MCNC: 35 MG/DL (ref 8–25)
CALCIUM SERPL-MCNC: 7.5 MG/DL (ref 8.5–10.4)
CHLORIDE SERPL-SCNC: 108 MMOL/L (ref 97–107)
CO2 SERPL-SCNC: 23 MMOL/L (ref 24–31)
CREAT SERPL-MCNC: 0.9 MG/DL (ref 0.4–1.6)
ERYTHROCYTE [DISTWIDTH] IN BLOOD BY AUTOMATED COUNT: 15.5 % (ref 11.5–14.5)
GFR SERPL CREATININE-BSD FRML MDRD: 59 ML/MIN/1.73M*2
GLUCOSE SERPL-MCNC: 135 MG/DL (ref 65–99)
HCT VFR BLD AUTO: 23.3 % (ref 36–46)
HGB BLD-MCNC: 7.6 G/DL (ref 12–16)
INR PPP: 1.2 (ref 0.9–1.2)
MCH RBC QN AUTO: 31 PG (ref 26–34)
MCHC RBC AUTO-ENTMCNC: 32.6 G/DL (ref 32–36)
MCV RBC AUTO: 95 FL (ref 80–100)
NRBC BLD-RTO: 0.8 /100 WBCS (ref 0–0)
PLATELET # BLD AUTO: 209 X10*3/UL (ref 150–450)
POTASSIUM SERPL-SCNC: 3.8 MMOL/L (ref 3.4–5.1)
PROTHROMBIN TIME: 12.2 SECONDS (ref 9.3–12.7)
RBC # BLD AUTO: 2.45 X10*6/UL (ref 4–5.2)
SODIUM SERPL-SCNC: 139 MMOL/L (ref 133–145)
WBC # BLD AUTO: 11 X10*3/UL (ref 4.4–11.3)

## 2023-12-17 PROCEDURE — 80048 BASIC METABOLIC PNL TOTAL CA: CPT | Performed by: NURSE PRACTITIONER

## 2023-12-17 PROCEDURE — 2500000001 HC RX 250 WO HCPCS SELF ADMINISTERED DRUGS (ALT 637 FOR MEDICARE OP): Performed by: NURSE PRACTITIONER

## 2023-12-17 PROCEDURE — 85027 COMPLETE CBC AUTOMATED: CPT | Performed by: NURSE PRACTITIONER

## 2023-12-17 PROCEDURE — 99231 SBSQ HOSP IP/OBS SF/LOW 25: CPT | Performed by: STUDENT IN AN ORGANIZED HEALTH CARE EDUCATION/TRAINING PROGRAM

## 2023-12-17 PROCEDURE — 2060000001 HC INTERMEDIATE ICU ROOM DAILY

## 2023-12-17 PROCEDURE — 2500000004 HC RX 250 GENERAL PHARMACY W/ HCPCS (ALT 636 FOR OP/ED): Performed by: INTERNAL MEDICINE

## 2023-12-17 PROCEDURE — 2500000004 HC RX 250 GENERAL PHARMACY W/ HCPCS (ALT 636 FOR OP/ED): Performed by: NURSE PRACTITIONER

## 2023-12-17 PROCEDURE — 36415 COLL VENOUS BLD VENIPUNCTURE: CPT | Performed by: NURSE PRACTITIONER

## 2023-12-17 PROCEDURE — 85610 PROTHROMBIN TIME: CPT | Performed by: NURSE PRACTITIONER

## 2023-12-17 PROCEDURE — 99232 SBSQ HOSP IP/OBS MODERATE 35: CPT

## 2023-12-17 PROCEDURE — 94760 N-INVAS EAR/PLS OXIMETRY 1: CPT

## 2023-12-17 RX ORDER — PREDNISONE 10 MG/1
10 TABLET ORAL DAILY
Status: DISCONTINUED | OUTPATIENT
Start: 2023-12-18 | End: 2023-12-19 | Stop reason: HOSPADM

## 2023-12-17 RX ADMIN — GABAPENTIN 100 MG: 100 CAPSULE ORAL at 09:10

## 2023-12-17 RX ADMIN — COLESTIPOL HYDROCHLORIDE 2 G: 1 TABLET ORAL at 09:10

## 2023-12-17 RX ADMIN — POLYETHYLENE GLYCOL 3350 17 G: 17 POWDER, FOR SOLUTION ORAL at 09:10

## 2023-12-17 RX ADMIN — LEVOTHYROXINE SODIUM 100 MCG: 0.1 TABLET ORAL at 06:36

## 2023-12-17 RX ADMIN — GABAPENTIN 100 MG: 100 CAPSULE ORAL at 15:18

## 2023-12-17 RX ADMIN — LATANOPROST 1 DROP: 50 SOLUTION OPHTHALMIC at 21:15

## 2023-12-17 RX ADMIN — COLESTIPOL HYDROCHLORIDE 2 G: 1 TABLET ORAL at 21:14

## 2023-12-17 RX ADMIN — HYDROCORTISONE SODIUM SUCCINATE 50 MG: 100 INJECTION, POWDER, FOR SOLUTION INTRAMUSCULAR; INTRAVENOUS at 10:04

## 2023-12-17 RX ADMIN — ACETAMINOPHEN 650 MG: 325 TABLET ORAL at 21:14

## 2023-12-17 RX ADMIN — GABAPENTIN 100 MG: 100 CAPSULE ORAL at 21:14

## 2023-12-17 ASSESSMENT — COGNITIVE AND FUNCTIONAL STATUS - GENERAL
TURNING FROM BACK TO SIDE WHILE IN FLAT BAD: A LITTLE
MOBILITY SCORE: 15
TOILETING: A LITTLE
MOBILITY SCORE: 15
DRESSING REGULAR LOWER BODY CLOTHING: A LITTLE
HELP NEEDED FOR BATHING: A LOT
DRESSING REGULAR UPPER BODY CLOTHING: A LITTLE
WALKING IN HOSPITAL ROOM: A LOT
MOVING TO AND FROM BED TO CHAIR: A LITTLE
TURNING FROM BACK TO SIDE WHILE IN FLAT BAD: A LITTLE
DAILY ACTIVITIY SCORE: 18
MOVING FROM LYING ON BACK TO SITTING ON SIDE OF FLAT BED WITH BEDRAILS: A LITTLE
PERSONAL GROOMING: A LITTLE
WALKING IN HOSPITAL ROOM: A LOT
STANDING UP FROM CHAIR USING ARMS: A LITTLE
MOVING FROM LYING ON BACK TO SITTING ON SIDE OF FLAT BED WITH BEDRAILS: A LITTLE
CLIMB 3 TO 5 STEPS WITH RAILING: TOTAL
STANDING UP FROM CHAIR USING ARMS: A LITTLE
CLIMB 3 TO 5 STEPS WITH RAILING: TOTAL
MOVING TO AND FROM BED TO CHAIR: A LITTLE

## 2023-12-17 ASSESSMENT — PAIN - FUNCTIONAL ASSESSMENT: PAIN_FUNCTIONAL_ASSESSMENT: 0-10

## 2023-12-17 ASSESSMENT — PAIN SCALES - WONG BAKER: WONGBAKER_NUMERICALRESPONSE: NO HURT

## 2023-12-17 NOTE — CARE PLAN
Problem: Skin  Goal: Decreased wound size/increased tissue granulation at next dressing change  Flowsheets (Taken 12/17/2023 1128)  Decreased wound size/increased tissue granulation at next dressing change:   Promote sleep for wound healing   Protective dressings over bony prominences  Goal: Participates in plan/prevention/treatment measures  Flowsheets (Taken 12/17/2023 1128)  Participates in plan/prevention/treatment measures:   Discuss with provider PT/OT consult   Elevate heels   Increase activity/out of bed for meals  Goal: Prevent/manage excess moisture  Flowsheets (Taken 12/17/2023 1128)  Prevent/manage excess moisture:   Moisturize dry skin   Cleanse incontinence/protect with barrier cream  Goal: Prevent/minimize sheer/friction injuries  Flowsheets (Taken 12/17/2023 1128)  Prevent/minimize sheer/friction injuries:   HOB 30 degrees or less   Increase activity/out of bed for meals   Turn/reposition every 2 hours/use positioning/transfer devices  Goal: Promote/optimize nutrition  Flowsheets (Taken 12/17/2023 1128)  Promote/optimize nutrition:   Consume > 50% meals/supplements   Monitor/record intake including meals  Goal: Promote skin healing  Flowsheets (Taken 12/17/2023 1128)  Promote skin healing:   Assess skin/pad under line(s)/device(s)   Protective dressings over bony prominences   Turn/reposition every 2 hours/use positioning/transfer devices   The patient's goals for the shift include less swelling in arms    The clinical goals for the shift include no falls

## 2023-12-17 NOTE — PROGRESS NOTES
Ina Mcgowan is a 89 y.o. female on day 3 of admission presenting with Hematoma of left chest wall, initial encounter.      Subjective   Patient seen and examined. Awake/alert/oriented. Resting in bed. Denies chest pain, shortness of breath, fevers, chills, nausea, or vomiting. No abdominal discomfort. Denies back pain. Discussed with RN, area of ecchymosis appears to be larger, lighter purple with less protrusion to left back. Hemoglobin did drop to 7.6 this AM, will continue to monitor           Objective     Last Recorded Vitals  /62 (BP Location: Left arm, Patient Position: Sitting)   Pulse 94   Temp 36.3 °C (97.3 °F) (Oral)   Resp 18   Wt 66.6 kg (146 lb 13.2 oz)   SpO2 95%   Intake/Output last 3 Shifts:  No intake or output data in the 24 hours ending 12/17/23 0950      Admission Weight  Weight: 68.5 kg (151 lb) (12/14/23 2048)    Daily Weight  12/17/23 : 66.6 kg (146 lb 13.2 oz)    Image Results  CT head wo IV contrast  Narrative: Interpreted By:  Carlito Burdick,   STUDY:  CT HEAD WO IV CONTRAST; 12/14/2023 6:09 pm      INDICATION:  Signs/Symptoms:unknown injury to back, on coumadin, possible fall.      COMPARISON:  None      ACCESSION NUMBER(S):  [id number]      ORDERING CLINICIAN:  MARTINA FRIAS      TECHNIQUE:  CT was performed with one or more of the following dose reduction  techniques: automated exposure control, adjustment of the mA and/or  kV according to patient size, or use of iterative reconstruction  technique.              PROCEDURE: 3.0 mm axial images were obtained through the brain, to  include the posterior fossa without intravenous contrast enhancement.      FINDINGS:  There is symmetric volume loss of the cerebral hemispheres. Advanced,  significant decreased attenuation in the bilateral periventricular  white matter, consistent with microangiopathy is noted.  There is  accentuation of the lateral ventricles and 3rd and 4th ventricle and  the other CSF spaces due to the  symmetric volume loss of the cerebral  more than cerebellar hemispheres. Brainstem is unremarkable.  Cerebellar hemispheres are symmetric. No intra- or extra-axial mass  or abnormal blood products are demonstrated.      Mild ethmoid mucosal thickening is seen and scattered. Mild inferior  left maxillary mucosal thickening demonstrated in un subcentimeter  mucous retention cyst right sphenoid sinus is seen. Mastoids are  clear. Calvarium and scalp are unremarkable. No posttraumatic  abnormality demonstrated.      Impression: 1. No acute intracranial findings.  2. Symmetric volume loss of the cerebral hemispheres.  3. Periventricular Microangiopathy.  4. No posttraumatic abnormality.      This report has been produced using speech recognition.  This exam is available in DICOM format to non-affiliated healthcare  facilities on a secure media free searchable basis with prior patient  authorization. The patient exposure is reported to a radiation dose  index registry. All CT examinations are performed with one or more of  the following dose reduction techniques: Automated Exposure Control,  Adjustment of mA and/or KV according to patient size, or use of  iterative reconstruction techniques.      MACRO:  None      Signed by: Carlito Burdick 12/14/2023 7:26 PM  Dictation workstation:   [id number]  CT chest abdomen pelvis w IV contrast  Narrative: Interpreted By:  Sergio Grant,   STUDY:  CT CHEST ABDOMEN PELVIS W IV CONTRAST; 12/14/2023 11:25 am      INDICATION:  Signs/Symptoms:trauma.  Bruising on right side of trunk      COMPARISON:  None..      ACCESSION NUMBER(S):  [id number]      ORDERING CLINICIAN:  SASHA BIRMINGHAM      TECHNIQUE:  CT axial images through the chest, abdomen, and pelvis with  intravenous contrast, 75 ml of Omnipaque 350 was administered.   Post  processing sagittal and coronal reconstruction images were also  performed.      All CT examinations are performed with 1 or more of the following  dose  reduction techniques: Automated exposure control, adjustment of  mA and/or kv according to patient's size, or use of iterative  reconstruction techniques.      FINDINGS:  Chest:  Lung and Large Airways: within normal limits.  Pleura: Small right-sided pleural effusion is noted  Vessels: within normal limits.  Heart: Normal size. No pericardial effusion.  Mediastinum and Eileen: within normal limits.  Chest Wall and Lower Neck: Large heterogeneously high attenuation  collection is noted in this soft tissues overlying the posterolateral  left hemithorax. It measures at least 8 by 10 cm      Abdomen:  Liver: within normal limits.  Bile Ducts: Pneumobilia is noted  Gallbladder: Surgical clips are noted in the gallbladder fossa  Pancreas: within normal limits.  Spleen: within normal limits.  Adrenals: within normal limits.  Kidneys: within normal limits.      Pelvis:  No pelvic masses.  Ureters: within normal limits.  Bladder: within normal limits.      Bowel: There is extensive diverticulosis of the sigmoid and  descending colon. No evidence for diverticulitis is noted. Mesenteric  Lymph Nodes: No enlarged mesenteric lymph nodes. Peritoneum: No  ascites or free air, no fluid collection. Vessels: within normal  limits. Retroperitoneum: within normal limits.  Abdominal Wall: within normal limits.  Bones: There is scoliotic deformity of the lumbar spine concave to  the right. 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      Impression: Findings suspicious for soft tissue hematoma in the subcutaneous soft  tissues of the posterolateral left chest      MACRO:  none      Signed by: Sergio Grant 12/14/2023 11:56 AM  Dictation workstation:   [id number]      Physical Exam  Vitals reviewed.   Constitutional:       Comments: Elderly   HENT:      Head: Normocephalic and atraumatic.   Eyes:      Extraocular Movements: Extraocular movements intact.      Conjunctiva/sclera: Conjunctivae normal.    Cardiovascular:      Rate and Rhythm: Normal rate and regular rhythm.   Pulmonary:      Effort: Pulmonary effort is normal.      Breath sounds: Normal breath sounds. No wheezing, rhonchi or rales.   Abdominal:      General: Bowel sounds are normal.      Palpations: Abdomen is soft.      Tenderness: There is no abdominal tenderness.   Musculoskeletal:         General: Normal range of motion.   Skin:     General: Skin is warm and dry.      Capillary Refill: Capillary refill takes less than 2 seconds.      Findings: Bruising present.      Comments: Large hematoma to left chest, side, around to left back   Neurological:      General: No focal deficit present.      Mental Status: She is alert and oriented to person, place, and time.   Psychiatric:         Mood and Affect: Mood normal.         Behavior: Behavior normal.         Relevant Results  Lab Results   Component Value Date    WBC 11.0 12/17/2023    HGB 7.6 (L) 12/17/2023    HCT 23.3 (L) 12/17/2023    MCV 95 12/17/2023     12/17/2023     Lab Results   Component Value Date    GLUCOSE 135 (H) 12/17/2023    CALCIUM 7.5 (L) 12/17/2023     12/17/2023    K 3.8 12/17/2023    CO2 23 (L) 12/17/2023     (H) 12/17/2023    BUN 35 (H) 12/17/2023    CREATININE 0.90 12/17/2023            Assessment/Plan      Principal Problem:    Hematoma of left chest wall, initial encounter    Hematoma left chest, side, back  Noted large hematoma, unsure if there was a fall or injury, patient does not recall  INR elevated at 4.5, H/H 7.7/24.7 on admit  Given Vitamin K and FFP in ED  Trend H/H  Monitor hematoma close  Received 2 unit PRBC   CT head without acute process  Consult general surgery, appreciate recommendations     Supratherapeutic INR  INR 4.5, given Vitamin K and FFP in ED, decreased to 1.2, 1.1  Hold Coumadin, takes for A fib  Daily INR  Consult cardiology, appreciate recommendation, discussed risks versus benefits of continuing anticoagulation and the decision  was made to discontinue anticoagulation     Atrial Fibrillation  Monitor on tele  Hold metoprolol for now due to bleed/low blood pressure  Will not resume anticoagulation due to risks     Hypertension  Hold antihypertensives for now due to hematoma/low blood pressure     Hyperlipidemia  Resume cholesterol medication     Hypothyroidism  Resume home levothyroxine  Check TSH, normal     Acute kidney injury  Given IV fluids, improved, monitor close  Repeat BMP in AM     Hyperkalemia  Potassium 5.4, repeat, 4.2, monitor     Urinary Tract Infection  UA positive, culture pending  IV ceftriaxone, follow culture, no growth, final , will stop ATB     Plan  Admit to SDU  Monitor on tele  Trend H/H  S/p 2 unit PRBC   Monitor blood pressure  Hold antihypertensives  On chronic prednisone, stress dose steroids given, transition back to prednisone 10mg, resume 5mg daily dose at discharge  Consult general surgery, appreciate recs  DVT prophylaxis: stop Coumadin, will not resume on discharge  Consult cardiology, appreciate recs, echo ordered  Daily INR, CBC, BMP  PT/OT recommending moderate intensity rehab. Patient was at [facility] for SNF, lived in independent living, plan is to transition to assisted living when she completes rehab.   Anticipate discharge to SNF in the next 24-48 hours    Patient is a DNRCCA DNI                 Adrienne Vigil, APRN-CNP

## 2023-12-17 NOTE — PROGRESS NOTES
"Ina Mcgowan is a 89 y.o. female on day 3 of admission presenting with Hematoma of left chest wall, initial encounter.    Subjective   Doing well overnight.  Waiting for breakfast this morning. Appears more comfortable today.       Objective     Physical Exam  Vitals and nursing note reviewed.   Constitutional:       Appearance: Normal appearance.   HENT:      Head: Normocephalic and atraumatic.      Mouth/Throat:      Mouth: Mucous membranes are moist.      Pharynx: Oropharynx is clear.   Eyes:      Extraocular Movements: Extraocular movements intact.      Pupils: Pupils are equal, round, and reactive to light.   Cardiovascular:      Rate and Rhythm: Normal rate and regular rhythm.      Pulses: Normal pulses.   Pulmonary:      Effort: Pulmonary effort is normal.      Breath sounds: Normal breath sounds.   Abdominal:      General: There is no distension.      Palpations: Abdomen is soft.      Tenderness: There is no abdominal tenderness.   Musculoskeletal:      Cervical back: Normal range of motion and neck supple.   Skin:     General: Skin is warm and dry.      Comments: Large ecchymoses over left side from shoulder down through buttock and tracking medially to the right across the back. Decreasing size hematoma in inferior left axilla   Neurological:      General: No focal deficit present.      Mental Status: She is alert and oriented to person, place, and time.   Psychiatric:         Mood and Affect: Mood normal.         Behavior: Behavior normal.         Last Recorded Vitals  Blood pressure (!) 126/47, pulse 87, temperature 37.1 °C (98.8 °F), temperature source Temporal, resp. rate 18, height 1.575 m (5' 2\"), weight 66.6 kg (146 lb 13.2 oz), SpO2 95 %.  Intake/Output last 3 Shifts:  I/O last 3 completed shifts:  In: 340 (5.1 mL/kg) [P.O.:340]  Out: - (0 mL/kg)   Weight: 66.6 kg     Relevant Results  Results for orders placed or performed during the hospital encounter of 12/14/23 (from the past 24 hour(s)) "   Protime-INR   Result Value Ref Range    Protime 12.2 9.3 - 12.7 seconds    INR 1.2 0.9 - 1.2   CBC   Result Value Ref Range    WBC 11.0 4.4 - 11.3 x10*3/uL    nRBC 0.8 (H) 0.0 - 0.0 /100 WBCs    RBC 2.45 (L) 4.00 - 5.20 x10*6/uL    Hemoglobin 7.6 (L) 12.0 - 16.0 g/dL    Hematocrit 23.3 (L) 36.0 - 46.0 %    MCV 95 80 - 100 fL    MCH 31.0 26.0 - 34.0 pg    MCHC 32.6 32.0 - 36.0 g/dL    RDW 15.5 (H) 11.5 - 14.5 %    Platelets 209 150 - 450 x10*3/uL   Basic Metabolic Panel   Result Value Ref Range    Glucose 135 (H) 65 - 99 mg/dL    Sodium 139 133 - 145 mmol/L    Potassium 3.8 3.4 - 5.1 mmol/L    Chloride 108 (H) 97 - 107 mmol/L    Bicarbonate 23 (L) 24 - 31 mmol/L    Urea Nitrogen 35 (H) 8 - 25 mg/dL    Creatinine 0.90 0.40 - 1.60 mg/dL    eGFR 59 (L) >60 mL/min/1.73m*2    Calcium 7.5 (L) 8.5 - 10.4 mg/dL    Anion Gap 8 <=19 mmol/L         Assessment/Plan   Principal Problem:    Hematoma of left chest wall, initial encounter    Doing well overnight. Hemoglobin trended down slightly, likely equilibration.  Her vitals and other labs have remained stable.  She is tolerating diet. Skin overlying is ecchymotic but viable. No drainage or debridement required. Will cont to follow    Danelle Drummond MD

## 2023-12-18 ENCOUNTER — APPOINTMENT (OUTPATIENT)
Dept: CARDIOLOGY | Facility: HOSPITAL | Age: 88
DRG: 605 | End: 2023-12-18
Payer: MEDICARE

## 2023-12-18 ENCOUNTER — APPOINTMENT (OUTPATIENT)
Dept: RADIOLOGY | Facility: HOSPITAL | Age: 88
DRG: 605 | End: 2023-12-18
Payer: MEDICARE

## 2023-12-18 PROBLEM — M79.89 LEFT ARM SWELLING: Status: ACTIVE | Noted: 2023-12-18

## 2023-12-18 LAB
ANION GAP SERPL CALC-SCNC: 6 MMOL/L
AORTIC VALVE MEAN GRADIENT: 22
AORTIC VALVE PEAK VELOCITY: 3.39
AV PEAK GRADIENT: 46
AVA (PEAK VEL): 0.87
AVA (VTI): 0.85
BUN SERPL-MCNC: 32 MG/DL (ref 8–25)
CALCIUM SERPL-MCNC: 8 MG/DL (ref 8.5–10.4)
CHLORIDE SERPL-SCNC: 109 MMOL/L (ref 97–107)
CO2 SERPL-SCNC: 25 MMOL/L (ref 24–31)
CREAT SERPL-MCNC: 0.9 MG/DL (ref 0.4–1.6)
EJECTION FRACTION APICAL 4 CHAMBER: 73.8
EJECTION FRACTION: 63
ERYTHROCYTE [DISTWIDTH] IN BLOOD BY AUTOMATED COUNT: 15.2 % (ref 11.5–14.5)
GFR SERPL CREATININE-BSD FRML MDRD: 59 ML/MIN/1.73M*2
GLUCOSE SERPL-MCNC: 82 MG/DL (ref 65–99)
HCT VFR BLD AUTO: 24 % (ref 36–46)
HGB BLD-MCNC: 7.5 G/DL (ref 12–16)
INR PPP: 1.2 (ref 0.9–1.2)
LEFT VENTRICLE INTERNAL DIMENSION DIASTOLE: 2.66 (ref 3.5–6)
LEFT VENTRICULAR OUTFLOW TRACT DIAMETER: 2
MCH RBC QN AUTO: 30.9 PG (ref 26–34)
MCHC RBC AUTO-ENTMCNC: 31.3 G/DL (ref 32–36)
MCV RBC AUTO: 99 FL (ref 80–100)
MITRAL VALVE E/A RATIO: 0.63
MITRAL VALVE E/E' RATIO: 13.71
NRBC BLD-RTO: 1 /100 WBCS (ref 0–0)
PLATELET # BLD AUTO: 188 X10*3/UL (ref 150–450)
POTASSIUM SERPL-SCNC: 4.1 MMOL/L (ref 3.4–5.1)
PROTHROMBIN TIME: 12.3 SECONDS (ref 9.3–12.7)
RBC # BLD AUTO: 2.43 X10*6/UL (ref 4–5.2)
RIGHT VENTRICLE FREE WALL PEAK S': 14
RIGHT VENTRICLE PEAK SYSTOLIC PRESSURE: 13.1
SODIUM SERPL-SCNC: 140 MMOL/L (ref 133–145)
TRICUSPID ANNULAR PLANE SYSTOLIC EXCURSION: 1.6
WBC # BLD AUTO: 10.4 X10*3/UL (ref 4.4–11.3)

## 2023-12-18 PROCEDURE — 85610 PROTHROMBIN TIME: CPT | Performed by: NURSE PRACTITIONER

## 2023-12-18 PROCEDURE — 80048 BASIC METABOLIC PNL TOTAL CA: CPT | Performed by: NURSE PRACTITIONER

## 2023-12-18 PROCEDURE — 93971 EXTREMITY STUDY: CPT

## 2023-12-18 PROCEDURE — 2500000004 HC RX 250 GENERAL PHARMACY W/ HCPCS (ALT 636 FOR OP/ED): Performed by: INTERNAL MEDICINE

## 2023-12-18 PROCEDURE — 2500000001 HC RX 250 WO HCPCS SELF ADMINISTERED DRUGS (ALT 637 FOR MEDICARE OP): Performed by: NURSE PRACTITIONER

## 2023-12-18 PROCEDURE — 93306 TTE W/DOPPLER COMPLETE: CPT | Performed by: INTERNAL MEDICINE

## 2023-12-18 PROCEDURE — 36415 COLL VENOUS BLD VENIPUNCTURE: CPT | Performed by: NURSE PRACTITIONER

## 2023-12-18 PROCEDURE — 93306 TTE W/DOPPLER COMPLETE: CPT

## 2023-12-18 PROCEDURE — 2060000001 HC INTERMEDIATE ICU ROOM DAILY

## 2023-12-18 PROCEDURE — 99232 SBSQ HOSP IP/OBS MODERATE 35: CPT | Performed by: STUDENT IN AN ORGANIZED HEALTH CARE EDUCATION/TRAINING PROGRAM

## 2023-12-18 PROCEDURE — 2500000004 HC RX 250 GENERAL PHARMACY W/ HCPCS (ALT 636 FOR OP/ED): Performed by: NURSE PRACTITIONER

## 2023-12-18 PROCEDURE — 92610 EVALUATE SWALLOWING FUNCTION: CPT | Mod: GN | Performed by: SPEECH-LANGUAGE PATHOLOGIST

## 2023-12-18 PROCEDURE — 85027 COMPLETE CBC AUTOMATED: CPT | Performed by: NURSE PRACTITIONER

## 2023-12-18 RX ORDER — CHOLESTYRAMINE 4 G/9G
4 POWDER, FOR SUSPENSION ORAL
Status: DISCONTINUED | OUTPATIENT
Start: 2023-12-18 | End: 2023-12-19 | Stop reason: HOSPADM

## 2023-12-18 RX ADMIN — POLYETHYLENE GLYCOL 3350 17 G: 17 POWDER, FOR SOLUTION ORAL at 11:32

## 2023-12-18 RX ADMIN — LATANOPROST 1 DROP: 50 SOLUTION OPHTHALMIC at 21:21

## 2023-12-18 RX ADMIN — PREDNISONE 10 MG: 10 TABLET ORAL at 10:50

## 2023-12-18 RX ADMIN — GABAPENTIN 100 MG: 100 CAPSULE ORAL at 10:50

## 2023-12-18 RX ADMIN — PERFLUTREN 4 ML OF DILUTION: 6.52 INJECTION, SUSPENSION INTRAVENOUS at 09:09

## 2023-12-18 RX ADMIN — CHOLESTYRAMINE 4 G: 4 POWDER, FOR SUSPENSION ORAL at 17:00

## 2023-12-18 RX ADMIN — ACETAMINOPHEN 650 MG: 325 TABLET ORAL at 13:19

## 2023-12-18 RX ADMIN — GABAPENTIN 100 MG: 100 CAPSULE ORAL at 15:45

## 2023-12-18 RX ADMIN — GABAPENTIN 100 MG: 100 CAPSULE ORAL at 21:21

## 2023-12-18 RX ADMIN — LEVOTHYROXINE SODIUM 100 MCG: 0.1 TABLET ORAL at 06:55

## 2023-12-18 ASSESSMENT — COGNITIVE AND FUNCTIONAL STATUS - GENERAL
TURNING FROM BACK TO SIDE WHILE IN FLAT BAD: A LOT
PERSONAL GROOMING: A LITTLE
DAILY ACTIVITIY SCORE: 18
MOVING TO AND FROM BED TO CHAIR: A LITTLE
MOVING FROM LYING ON BACK TO SITTING ON SIDE OF FLAT BED WITH BEDRAILS: A LITTLE
WALKING IN HOSPITAL ROOM: A LOT
MOVING TO AND FROM BED TO CHAIR: A LOT
HELP NEEDED FOR BATHING: A LOT
CLIMB 3 TO 5 STEPS WITH RAILING: TOTAL
DRESSING REGULAR LOWER BODY CLOTHING: A LITTLE
HELP NEEDED FOR BATHING: A LOT
STANDING UP FROM CHAIR USING ARMS: A LITTLE
DRESSING REGULAR UPPER BODY CLOTHING: A LITTLE
DRESSING REGULAR LOWER BODY CLOTHING: A LOT
MOBILITY SCORE: 12
PERSONAL GROOMING: A LITTLE
TURNING FROM BACK TO SIDE WHILE IN FLAT BAD: A LITTLE
TOILETING: A LITTLE
STANDING UP FROM CHAIR USING ARMS: A LOT
WALKING IN HOSPITAL ROOM: A LOT
TOILETING: A LITTLE
DRESSING REGULAR UPPER BODY CLOTHING: A LITTLE
DAILY ACTIVITIY SCORE: 17
MOBILITY SCORE: 15
CLIMB 3 TO 5 STEPS WITH RAILING: TOTAL
MOVING FROM LYING ON BACK TO SITTING ON SIDE OF FLAT BED WITH BEDRAILS: A LITTLE

## 2023-12-18 ASSESSMENT — PAIN SCALES - GENERAL
PAINLEVEL_OUTOF10: 2
PAINLEVEL_OUTOF10: 0 - NO PAIN
PAINLEVEL_OUTOF10: 0 - NO PAIN
PAINLEVEL_OUTOF10: 5 - MODERATE PAIN
PAINLEVEL_OUTOF10: 0 - NO PAIN

## 2023-12-18 ASSESSMENT — PAIN - FUNCTIONAL ASSESSMENT
PAIN_FUNCTIONAL_ASSESSMENT: 0-10

## 2023-12-18 NOTE — PROGRESS NOTES
[facility] spoke with pts son Rommel this morning who was asking about when dc will occur, this is not yet known, attending stating possibly in a day or so. Family is paying to hold pts bed at [facility]. [facility] sent updated clinicals to [facility], precert is needed, will request internal  team to initiate this.          12/18/23 7704   Discharge Planning   Patient expects to be discharged to: [facility] - precert needed

## 2023-12-18 NOTE — PROGRESS NOTES
"Ina Mcgowan is a 89 y.o. female on day 4 of admission presenting with Hematoma of left chest wall, initial encounter.    Subjective   Doing \"ok\". Hematoma decreasing in size. Bruising moving downwards as expected by gravity. Left arm now with swelling and pain.    Objective     Physical Exam  Vitals and nursing note reviewed.   Constitutional:       Appearance: Normal appearance.   HENT:      Head: Normocephalic and atraumatic.      Mouth/Throat:      Mouth: Mucous membranes are moist.      Pharynx: Oropharynx is clear.   Eyes:      Extraocular Movements: Extraocular movements intact.      Pupils: Pupils are equal, round, and reactive to light.   Cardiovascular:      Rate and Rhythm: Normal rate and regular rhythm.      Pulses: Normal pulses.   Pulmonary:      Effort: Pulmonary effort is normal.      Breath sounds: Normal breath sounds.   Abdominal:      General: There is no distension.      Palpations: Abdomen is soft.      Tenderness: There is no abdominal tenderness.   Musculoskeletal:      Right upper arm: Normal.      Left upper arm: Swelling, edema and tenderness present.      Right forearm: Normal.      Left forearm: Swelling, edema and tenderness present.      Cervical back: Normal range of motion and neck supple.   Skin:     General: Skin is warm and dry.      Comments: Large ecchymoses over left side from shoulder down through buttock and tracking medially to the right across the back. Decreasing size hematoma in inferior left axilla   Neurological:      General: No focal deficit present.      Mental Status: She is alert and oriented to person, place, and time.   Psychiatric:         Mood and Affect: Mood normal.         Behavior: Behavior normal.         Last Recorded Vitals  Blood pressure (!) 137/45, pulse 69, temperature 36 °C (96.8 °F), temperature source Temporal, resp. rate 19, height 1.575 m (5' 2\"), weight 66.3 kg (146 lb 2.6 oz), SpO2 96 %.  Intake/Output last 3 Shifts:  I/O last 3 " completed shifts:  In: - (0 mL/kg)   Out: 2 (0 mL/kg) [Urine:2 (0 mL/kg/hr)]  Weight: 66.3 kg     Relevant Results  Results for orders placed or performed during the hospital encounter of 12/14/23 (from the past 24 hour(s))   Protime-INR   Result Value Ref Range    Protime 12.3 9.3 - 12.7 seconds    INR 1.2 0.9 - 1.2   CBC   Result Value Ref Range    WBC 10.4 4.4 - 11.3 x10*3/uL    nRBC 1.0 (H) 0.0 - 0.0 /100 WBCs    RBC 2.43 (L) 4.00 - 5.20 x10*6/uL    Hemoglobin 7.5 (L) 12.0 - 16.0 g/dL    Hematocrit 24.0 (L) 36.0 - 46.0 %    MCV 99 80 - 100 fL    MCH 30.9 26.0 - 34.0 pg    MCHC 31.3 (L) 32.0 - 36.0 g/dL    RDW 15.2 (H) 11.5 - 14.5 %    Platelets 188 150 - 450 x10*3/uL   Basic Metabolic Panel   Result Value Ref Range    Glucose 82 65 - 99 mg/dL    Sodium 140 133 - 145 mmol/L    Potassium 4.1 3.4 - 5.1 mmol/L    Chloride 109 (H) 97 - 107 mmol/L    Bicarbonate 25 24 - 31 mmol/L    Urea Nitrogen 32 (H) 8 - 25 mg/dL    Creatinine 0.90 0.40 - 1.60 mg/dL    eGFR 59 (L) >60 mL/min/1.73m*2    Calcium 8.0 (L) 8.5 - 10.4 mg/dL    Anion Gap 6 <=19 mmol/L   Transthoracic Echo (TTE) Complete   Result Value Ref Range    AV pk riaz 3.39     LVOT diam 2.00     AV mn grad 22.0     MV E/A ratio 0.63     Tricuspid annular plane systolic excursion 1.6     LV biplane EF 63     MV avg E/e' ratio 13.71     RV free wall pk S' 14.00     RVSP 13.1     LVIDd 2.66     AV pk grad 46.0     Aortic Valve Area by Continuity of VTI 0.85     Aortic Valve Area by Continuity of Peak Velocity 0.87     LV A4C EF 73.8      Left upper extremity ultrasound pending    Assessment/Plan   Principal Problem:    Hematoma of left chest wall, initial encounter  Active Problems:    Left arm swelling    12/18: H&H stable from yesterday. Skin overlying hematoma remains viable. She now has new swelling of her left arm with tenderness to palpation. Ultrasound ordered.     12/17: Doing well overnight. Hemoglobin trended down slightly, likely equilibration.  Her vitals/  and other labs have remained stable.  She is tolerating diet. Skin overlying is ecchymotic but viable. No drainage or debridement required. Will cont to follow    Titus Blunt, LANRE-CNP

## 2023-12-18 NOTE — PROGRESS NOTES
Spiritual Care Visit    Clinical Encounter Type  Visited With: Patient and family together  Routine Visit: Introduction  Continue Visiting: Yes         Values/Beliefs  Spiritual Requests During Hospitalization: Anointed with son Rommel present    Sacramental Encounters  Communion: Patient wants communion  Communion Given Indicator: Yes  Sacrament of Sick-Anointing: Anointed     Son's name is Rommelmark Pendletonugo Palomares

## 2023-12-18 NOTE — PROGRESS NOTES
Physical Therapy                 Therapy Communication Note    Patient Name: Ina Mcgowan  MRN: [id number]  Today's Date: 12/18/2023     Discipline: Physical Therapy    Missed Visit Reason: Missed Visit Reason: Patient in a medical procedure (Pt off floor for ECHO.)    Missed Time: Attempt    Comment:

## 2023-12-18 NOTE — PROGRESS NOTES
Speech-Language Pathology    Inpatient Speech-Language Pathology Clinical Swallow Evaluation    Patient Name: Ina Mcgowan  MRN: [id number]  Today's Date: 12/18/2023   Time Calculation  Start Time: 1100  Stop Time: 1117  Time Calculation (min): 17 min         Current Problem:   1. Hematoma of left chest wall, initial encounter        2. Supratherapeutic INR        3. Murmur  Transthoracic Echo (TTE) Complete    Transthoracic Echo (TTE) Complete      4. Left arm swelling  Vascular US upper extremity venous duplex left    Vascular US upper extremity venous duplex left        Ina Mcgowan is a 89 y.o. female with a past medical history of atrial fibrillation, on Coumadin, hyperlipidemia, hypertension, hypothyroidism, and osteoarthritis who presented to the emergency department with a hematoma.    Recommendations:  Risk for Aspiration: No  Solid Diet Recommendations : Regular (IDDSI Level 7)  Liquid Diet Recommendations: Thin (IDDSI Level 0)  Compensatory Swallowing Strategies: Upright 90 degrees as possible for all oral intake  Medication Administration Recommendations: With Liquid (crush meds as needed, pt reports difficulty with larger pills)    Assessment:  Assessment Results: oropharyngeal swallow WFL, no s/s aspiration  Medical Staff Made Aware: Yes  Strengths: Cognition, Motivation, Family/Caregiver Suppport  Barriers: None    Pain:  Pain Assessment: 0-10  Pain Score: 0 - No pain     Plan:  SLP Plan: No skilled SLP  No Skilled SLP: Independent with swallowing    Subjective   Pt reports sometimes has difficulty swallowing large pills, no other swallowing difficulties reported.  General Visit Information:  Reason for Referral: difficulty swallowing pills  Past Medical History Relevant to Rehab: afib, HLD, HTN, hypothyroidism, OA  Prior to Session Communication: Bedside nurse (santi lowe RN)  Date of Onset: 12/14/23  Date of Order: 12/17/23  Base[employer] Diet: regular solids, thin liquids  Current Diet :  regular solids, thin liquids     Objective   Baseline Assessment:  Behavior/Cognition: Alert, Cooperative, Pleasant mood  Vision: Functional for self-feeding  Patient Positioning: Upright in Bed  Baseline Vocal Quality: Dysphonic  Oral/Motor Assessment:  Oral Hygiene: oral mucosa moist  Dentition: Some Missing Teeth  Oral Motor: Within Functional Limits  Vocal Quality: Exceptions to WFL  Vocal Quality Impairment: Hoarse      Consistencies Trialed: 4 oz thin liquids via cup and straw (single and consecutive sips), 2 oz applesauce and 1 nette cracker     Clinical Observations: Pt demon adequate oral manipulation/mastication with regular solid, adequate oral clearance, swallow onset appeared timely, laryngeal elevation present upon palpation, no overt s/s aspiration        Inpatient:  Education Documentation  Pt/nursing education provided re: regarding role of ST, purpose of assessment, clinical impressions, recommendations, safe swallow strategies, Pt/nurse verbalized understanding and agreement.

## 2023-12-18 NOTE — PROGRESS NOTES
Occupational Therapy                 Therapy Communication Note    Patient Name: Ina Mcgowan  MRN: [id number]  Today's Date: 12/18/2023     Discipline: Occupational Therapy    Missed Visit Reason: Cancel (Pt with LUE pain and swelling now per General Surgery and orders put in for US LUE to r/o DVT.)    Missed Time: Cancel

## 2023-12-18 NOTE — PROGRESS NOTES
Mita Aviles is a 89 y.o. female on day 4 of admission presenting with Hematoma of left chest wall, initial encounter.      Subjective   Patient seen and examined. Awake/alert/oriented. Resting in bed. Denies chest pain, shortness of breath, fevers, chills, nausea, or vomiting. No abdominal discomfort. Denies back pain. Discussed with RN, area of ecchymosis appears to be larger, lighter purple with less protrusion to left back. Hemoglobin did drop to 7.5 this AM, will continue to monitor           Objective     Last Recorded Vitals  BP (!) 137/45 (BP Location: Right arm, Patient Position: Lying)   Pulse 69   Temp 36.2 °C (97.2 °F) (Axillary)   Resp 19   Wt 66.3 kg (146 lb 2.6 oz)   SpO2 96%   Intake/Output last 3 Shifts:    Intake/Output Summary (Last 24 hours) at 12/18/2023 1203  Last data filed at 12/18/2023 0400  Gross per 24 hour   Intake --   Output 2 ml   Net -2 ml         Admission Weight  Weight: 68.5 kg (151 lb) (12/14/23 2048)    Daily Weight  12/18/23 : 66.3 kg (146 lb 2.6 oz)    Image Results  Transthoracic Echo ([place]E) [place] [address]             Phone [phone]    TRANSTHORACIC ECHOCARDIOGRAM REPORT       Patient Name:      MITA AVILES   Reading Physician:   [id number] Khalil DO  Study Date:        12/18/2023          Ordering Provider:   [id number] KHALIL  MRN/PID:           [id number]            Fellow:  Accession#:        [id number]        Nurse:  Date of Birth/Age: 9/14/1928 / 89      Sonographer:         Laura Johnson RCS                     years  Gender:            F                   Additional Staff:  Height:            157.48 cm           Admit Date:          12/16/2023  Weight:            66.23 kg            Admission Status:    Inpatient - Routine  BSA:               1.67 m2             Department Location:  Blood Pressure: 137 /45  mmHg    Study Type:    TRANSTHORACIC ECHO (TTE) COMPLETE  Diagnosis/ICD: Cardiac murmur, unspecified-R01.1  Indication:    Murmur  CPT Codes:     Echo Complete w Full Doppler-18945    Patient History:  Pertinent History: Hyperlipidemia HTN Murmur A-Fib.    Study Detail: The following Echo studies were performed: 2D, M-Mode, Doppler and                color flow. Technically challenging study due to body habitus,                patient lying in supine position, poor acoustic windows and                prominent lung artifact. Lumason used as a contrast agent for                endocardial border definition.       PHYSICIAN INTERPRETATION:  Left Ventricle: Left ventricular systolic function is normal, with an estimated ejection fraction of 60-65%. There are no regional wall motion abnormalities. The left ventricular cavity size is normal. Spectral Doppler shows an impaired relaxation pattern of left ventricular diastolic filling.  Left Atrium: The left atrium is normal in size.  Right Ventricle: The right ventricle is normal in size. There is normal right ventricular global systolic function.  Right Atrium: The right atrium is normal in size.  Aortic Valve: The aortic valve is trileaflet. There is moderate to severe aortic valve cusp calcification. There is evidence of moderate aortic valve stenosis.  There is no evidence of aortic valve regurgitation. The peak instantaneous gradient of the aortic valve is 46.0 mmHg. The mean gradient of the aortic valve is 22.0 mmHg.  Mitral Valve: The mitral valve is normal in structure. There is trace mitral valve regurgitation.  Tricuspid Valve: The tricuspid valve is structurally normal. There is trace tricuspid regurgitation.  Pulmonic Valve: The pulmonic valve is not well visualized. The pulmonic valve regurgitation was not well visualized.  Pericardium: There is no pericardial effusion noted.  Aorta: The aortic root is normal.  Systemic Veins: The inferior vena cava appears to  be of normal size. There is IVC inspiratory collapse greater than 50%.       CONCLUSIONS:   1. Left ventricular systolic function is normal with a 60-65% estimated ejection fraction.   2. Spectral Doppler shows an impaired relaxation pattern of left ventricular diastolic filling.   3. Moderate aortic valve stenosis.   4. There is moderate to severe aortic valve cusp calcification.    QUANTITATIVE DATA SUMMARY:  2D MEASUREMENTS:                           Normal Ranges:  LAs:           2.80 cm   (2.7-4.0cm)  IVSd:          1.06 cm   (0.6-1.1cm)  LVPWd:         0.93 cm   (0.6-1.1cm)  LVIDd:         2.66 cm   (3.9-5.9cm)  LVIDs:         2.08 cm  LV Mass Index: 41.0 g/m2  LV % FS        21.8 %    M-MODE MEASUREMENTS:                   Normal Ranges:  Ao Root: 2.60 cm (2.0-3.7cm)  AoV Exc: 1.00 cm (1.5-2.5cm)  LAs:     3.10 cm (2.7-4.0cm)    AORTA MEASUREMENTS:                     Normal Ranges:  AoV Exc:   1.00 cm (1.5-2.5cm)  Asc Ao, d: 2.70 cm (2.1-3.4cm)    LV SYSTOLIC FUNCTION BY 2D PLANIMETRY (MOD):                      Normal Ranges:  EF-A4C View: 73.8 % (>=55%)  EF-A2C View: 50.0 %  EF-Biplane:  63.5 %    LV DIASTOLIC FUNCTION:                             Normal Ranges:  MV Peak E:      0.76 m/s   (0.7-1.2 m/s)  MV Peak A:      1.20 m/s   (0.42-0.7 m/s)  E/A Ratio:      0.63       (1.0-2.2)  MV e'           0.06 m/s   (>8.0)  MV lateral e'   0.06 m/s  MV medial e'    0.05 m/s  E/e' Ratio:     13.71      (<8.0)  PulmV Sys Gulshan:  48.30 cm/s  PulmV Harry Gulshan: 28.40 cm/s  PulmV S/D Gulshan:  1.70    MITRAL VALVE:                  Normal Ranges:  MV DT: 189 msec (150-240msec)    AORTIC VALVE:                                     Normal Ranges:  AoV Vmax:                3.39 m/s  (<=1.7m/s)  AoV Peak P.0 mmHg (<20mmHg)  AoV Mean P.0 mmHg (1.7-11.5mmHg)  LVOT Max Gulshan:            0.94 m/s  (<=1.1m/s)  AoV VTI:                 79.90 cm  (18-25cm)  LVOT VTI:                21.60 cm  LVOT Diameter:            2.00 cm   (1.8-2.4cm)  AoV Area, VTI:           0.85 cm2  (2.5-5.5cm2)  AoV Area,Vmax:           0.87 cm2  (2.5-4.5cm2)  AoV Dimensionless Index: 0.27       RIGHT VENTRICLE:  TAPSE: 16.1 mm  RV s'  0.14 m/s    TRICUSPID VALVE/RVSP:                              Normal Ranges:  Peak TR Velocity: 1.59 m/s  RV Syst Pressure: 13.1 mmHg (< 30mmHg)  IVC Diam:         1.53 cm    PULMONIC VALVE:                           Normal Ranges:  PV Accel Time: 79 msec   (>120ms)  PV Max Gulshan:    2.4 m/s   (0.6-0.9m/s)  PV Max P.5 mmHg    Pulmonary Veins:  PulmV Harry Gulshan: 28.40 cm/s  PulmV S/D Gulshan:  1.70  PulmV Sys Gulshan:  48.30 cm/s       [id number] Malik Maiamandaallen   Electronically signed on 2023 at 9:36:04 AM       ** Final **      Physical Exam  Vitals reviewed.   Constitutional:       Comments: Elderly   HENT:      Head: Normocephalic and atraumatic.   Eyes:      Extraocular Movements: Extraocular movements intact.      Conjunctiva/sclera: Conjunctivae normal.   Cardiovascular:      Rate and Rhythm: Normal rate and regular rhythm.   Pulmonary:      Effort: Pulmonary effort is normal.      Breath sounds: Normal breath sounds. No wheezing, rhonchi or rales.   Abdominal:      General: Bowel sounds are normal.      Palpations: Abdomen is soft.      Tenderness: There is no abdominal tenderness.   Musculoskeletal:         General: Normal range of motion.   Skin:     General: Skin is warm and dry.      Capillary Refill: Capillary refill takes less than 2 seconds.      Findings: Bruising present.      Comments: Large hematoma to left chest, side, around to left back   Neurological:      General: No focal deficit present.      Mental Status: She is alert and oriented to person, place, and time.   Psychiatric:         Mood and Affect: Mood normal.         Behavior: Behavior normal.         Relevant Results  Lab Results   Component Value Date    WBC 10.4 2023    HGB 7.5 (L) 2023    HCT 24.0 (L) 2023    MCV  99 12/18/2023     12/18/2023     Lab Results   Component Value Date    GLUCOSE 82 12/18/2023    CALCIUM 8.0 (L) 12/18/2023     12/18/2023    K 4.1 12/18/2023    CO2 25 12/18/2023     (H) 12/18/2023    BUN 32 (H) 12/18/2023    CREATININE 0.90 12/18/2023            Assessment/Plan      Principal Problem:    Hematoma of left chest wall, initial encounter  Active Problems:    Left arm swelling    Hematoma left chest, side, back  Noted large hematoma, unsure if there was a fall or injury, patient does not recall  INR elevated at 4.5, H/H 7.7/24.7 on admit  Given Vitamin K and FFP in ED  Trend H/H  Monitor hematoma close  Received 2 unit PRBC   CT head without acute process  Consult general surgery, appreciate recommendations - no surgical intervention recommended.    Supratherapeutic INR  INR 4.5, given Vitamin K and FFP in ED, decreased to 1.2  Hold Coumadin, takes for A fib  Daily INR  Consult cardiology, appreciate recommendation, discussed risks versus benefits of continuing anticoagulation and the decision was made to discontinue anticoagulation     Atrial Fibrillation  Monitor on tele  Hold metoprolol for now due to bleed/low blood pressure- may resume soon as BP improving  Will not resume anticoagulation due to risks     Hypertension  Hold antihypertensives for now due to hematoma/low blood pressure     Hyperlipidemia  Resume cholesterol medication     Hypothyroidism  Resume home levothyroxine  Check TSH, normal     Acute kidney injury  Given IV fluids, improved, monitor close  Repeat BMP in AM     Hyperkalemia  resolved     Urinary Tract Infection  UA positive, culture negative -> ATB stopped     Plan  Admit to SDU  Monitor on tele  Trend H/H  S/p 2 unit PRBC   Monitor blood pressure  Hold antihypertensives for now  On chronic prednisone, stress dose steroids given, transition back to prednisone 10mg, resume 5mg daily dose at discharge  Consult general surgery, appreciate recs  DVT prophylaxis:  stop Coumadin, will not resume on discharge  Consult cardiology, appreciate recs, echo ordered and pending  Daily INR, CBC, BMP  PT/OT recommending moderate intensity rehab. Patient was at [place] for SNF, lived in independent living, plan is to transition to assisted living when she completes rehab.   Anticipate discharge to SNF in the next 24-48 hours    Patient is a DNRCCA DNI     Alexander Stephenson, APRN-CNP

## 2023-12-18 NOTE — NURSING NOTE
Assumed care of pt. Pt resting quietly in bed, respiratione even and unlabored. Hob slightly elevated.

## 2023-12-18 NOTE — PROGRESS NOTES
Speech-Language Pathology                 Therapy Communication Note    Patient Name: Ina Mcgowan  MRN: [id number]  Today's Date: 12/18/2023     Discipline: Speech Language Pathology    Missed Visit Reason:  pt off floor @ this time, will attempt later when pt available    Missed Time: Attempt    Comment:

## 2023-12-19 VITALS
WEIGHT: 145.5 LBS | SYSTOLIC BLOOD PRESSURE: 134 MMHG | RESPIRATION RATE: 19 BRPM | TEMPERATURE: 98.6 F | OXYGEN SATURATION: 97 % | HEIGHT: 62 IN | DIASTOLIC BLOOD PRESSURE: 65 MMHG | HEART RATE: 80 BPM | BODY MASS INDEX: 26.78 KG/M2

## 2023-12-19 LAB
ANION GAP SERPL CALC-SCNC: 7 MMOL/L
BUN SERPL-MCNC: 22 MG/DL (ref 8–25)
CALCIUM SERPL-MCNC: 7.9 MG/DL (ref 8.5–10.4)
CHLORIDE SERPL-SCNC: 104 MMOL/L (ref 97–107)
CO2 SERPL-SCNC: 25 MMOL/L (ref 24–31)
CREAT SERPL-MCNC: 0.7 MG/DL (ref 0.4–1.6)
ERYTHROCYTE [DISTWIDTH] IN BLOOD BY AUTOMATED COUNT: 15.3 % (ref 11.5–14.5)
GFR SERPL CREATININE-BSD FRML MDRD: 80 ML/MIN/1.73M*2
GLUCOSE SERPL-MCNC: 77 MG/DL (ref 65–99)
HCT VFR BLD AUTO: 26 % (ref 36–46)
HGB BLD-MCNC: 8.3 G/DL (ref 12–16)
INR PPP: 1.1 (ref 0.9–1.2)
MCH RBC QN AUTO: 30.7 PG (ref 26–34)
MCHC RBC AUTO-ENTMCNC: 31.9 G/DL (ref 32–36)
MCV RBC AUTO: 96 FL (ref 80–100)
NRBC BLD-RTO: 0.3 /100 WBCS (ref 0–0)
PLATELET # BLD AUTO: 180 X10*3/UL (ref 150–450)
POTASSIUM SERPL-SCNC: 3.9 MMOL/L (ref 3.4–5.1)
PROTHROMBIN TIME: 11.8 SECONDS (ref 9.3–12.7)
RBC # BLD AUTO: 2.7 X10*6/UL (ref 4–5.2)
SODIUM SERPL-SCNC: 136 MMOL/L (ref 133–145)
WBC # BLD AUTO: 11.8 X10*3/UL (ref 4.4–11.3)

## 2023-12-19 PROCEDURE — 85610 PROTHROMBIN TIME: CPT | Performed by: NURSE PRACTITIONER

## 2023-12-19 PROCEDURE — 2500000001 HC RX 250 WO HCPCS SELF ADMINISTERED DRUGS (ALT 637 FOR MEDICARE OP): Performed by: NURSE PRACTITIONER

## 2023-12-19 PROCEDURE — 36415 COLL VENOUS BLD VENIPUNCTURE: CPT | Performed by: NURSE PRACTITIONER

## 2023-12-19 PROCEDURE — 2500000004 HC RX 250 GENERAL PHARMACY W/ HCPCS (ALT 636 FOR OP/ED): Performed by: NURSE PRACTITIONER

## 2023-12-19 PROCEDURE — 85027 COMPLETE CBC AUTOMATED: CPT | Performed by: NURSE PRACTITIONER

## 2023-12-19 PROCEDURE — 82374 ASSAY BLOOD CARBON DIOXIDE: CPT | Performed by: NURSE PRACTITIONER

## 2023-12-19 RX ORDER — METOPROLOL SUCCINATE 25 MG/1
25 TABLET, EXTENDED RELEASE ORAL DAILY
Refills: 0
Start: 2023-12-20

## 2023-12-19 RX ORDER — PREDNISONE 10 MG/1
10 TABLET ORAL DAILY
Refills: 0
Start: 2023-12-19

## 2023-12-19 RX ADMIN — GABAPENTIN 100 MG: 100 CAPSULE ORAL at 10:17

## 2023-12-19 RX ADMIN — GABAPENTIN 100 MG: 100 CAPSULE ORAL at 16:05

## 2023-12-19 RX ADMIN — POLYETHYLENE GLYCOL 3350 17 G: 17 POWDER, FOR SOLUTION ORAL at 10:17

## 2023-12-19 RX ADMIN — PREDNISONE 10 MG: 10 TABLET ORAL at 10:17

## 2023-12-19 RX ADMIN — LEVOTHYROXINE SODIUM 100 MCG: 0.1 TABLET ORAL at 06:06

## 2023-12-19 RX ADMIN — HYDROCODONE BITARTRATE AND ACETAMINOPHEN 1 TABLET: 5; 325 TABLET ORAL at 16:10

## 2023-12-19 ASSESSMENT — COGNITIVE AND FUNCTIONAL STATUS - GENERAL
MOVING FROM LYING ON BACK TO SITTING ON SIDE OF FLAT BED WITH BEDRAILS: A LITTLE
MOVING TO AND FROM BED TO CHAIR: A LOT
STANDING UP FROM CHAIR USING ARMS: A LOT
TURNING FROM BACK TO SIDE WHILE IN FLAT BAD: A LITTLE
MOBILITY SCORE: 13
WALKING IN HOSPITAL ROOM: A LOT
CLIMB 3 TO 5 STEPS WITH RAILING: TOTAL
HELP NEEDED FOR BATHING: A LOT
DRESSING REGULAR LOWER BODY CLOTHING: A LOT
PERSONAL GROOMING: A LOT
DRESSING REGULAR UPPER BODY CLOTHING: A LOT
TOILETING: A LOT
DAILY ACTIVITIY SCORE: 14

## 2023-12-19 ASSESSMENT — PAIN SCALES - GENERAL
PAINLEVEL_OUTOF10: 0 - NO PAIN
PAINLEVEL_OUTOF10: 0 - NO PAIN
PAINLEVEL_OUTOF10: 5 - MODERATE PAIN
PAINLEVEL_OUTOF10: 0 - NO PAIN

## 2023-12-19 ASSESSMENT — PAIN - FUNCTIONAL ASSESSMENT
PAIN_FUNCTIONAL_ASSESSMENT: 0-10
PAIN_FUNCTIONAL_ASSESSMENT: 0-10
PAIN_FUNCTIONAL_ASSESSMENT: FLACC (FACE, LEGS, ACTIVITY, CRY, CONSOLABILITY)
PAIN_FUNCTIONAL_ASSESSMENT: 0-10

## 2023-12-19 ASSESSMENT — PAIN DESCRIPTION - ORIENTATION: ORIENTATION: LEFT

## 2023-12-19 ASSESSMENT — PAIN DESCRIPTION - LOCATION: LOCATION: BACK

## 2023-12-19 NOTE — NURSING NOTE
Took over care of pt at this time. Pt laying in bed, alert, aox3-4 Pt denies pain and requests to use BR. We attempt to get pt to side of bed, but pt appears weak and struggles to get buttocks to side of bed being unable to scoot effectively. So nursing staff suggests a bedpan for which pt is agreeable. Pt assisted onto a bedpan at this time. Pt has no other needs or complaints at this time. Pt oriented to call bell and it and belongings are placed in reach. Telemetry leads connected and reading on monitor. Bed alarm set. Continuing to monitor.

## 2023-12-19 NOTE — PROGRESS NOTES
Jan[facility] approved, attending CNP made aware.     [facility] called pts leatha hartmann and made him aware pt will dc to the facility today, he is agreeable for dc. This worker will call son when dc time known. Pt will transport via stretcher.      12/19/23 0837   Discharge Planning   Patient expects to be discharged to: [facility] - [facility] approved   Does the patient need discharge transport arranged? Yes   RoundTrip coordination needed? Yes   What day is the transport expected? 12/19/23

## 2023-12-19 NOTE — CARE PLAN
The patient's goals for the shift include less swelling in arms    The clinical goals for the shift include no falls    Over the shift, the patient did not make progress toward the following goals. Barriers to progression include MET all goals. Recommendations to address these barriers include MET ALL GOALS.

## 2023-12-19 NOTE — CARE PLAN
Problem: Fall/Injury  Goal: Verbalize understanding of personal risk factors for fall in the hospital  Outcome: Progressing  Goal: Use assistive devices by end of the shift  Outcome: Progressing  Goal: Pace activities to prevent fatigue by end of the shift  Outcome: Progressing     Problem: Pain  Goal: My pain/discomfort is manageable  Outcome: Progressing     Problem: Safety  Goal: Patient will be injury free during hospitalization  Outcome: Progressing  Goal: I will remain free of falls  Outcome: Progressing     Problem: Daily Care  Goal: Daily care needs are met  Outcome: Progressing     Problem: Psychosocial Needs  Goal: Demonstrates ability to cope with hospitalization/illness  Outcome: Progressing  Goal: Collaborate with me, my family, and caregiver to identify my specific goals  Outcome: Progressing     Problem: Discharge Barriers  Goal: My discharge needs are met  Outcome: Progressing     Problem: Skin  Goal: Decreased wound size/increased tissue granulation at next dressing change  Outcome: Progressing  Goal: Participates in plan/prevention/treatment measures  Outcome: Progressing  Goal: Prevent/manage excess moisture  Outcome: Progressing  Goal: Prevent/minimize sheer/friction injuries  Outcome: Progressing  Flowsheets (Taken 12/19/2023 0503)  Prevent/minimize sheer/friction injuries: Use pull sheet  Goal: Promote/optimize nutrition  Outcome: Progressing  Goal: Promote skin healing  Outcome: Progressing   The patient's goals for the shift include less swelling in arms    The clinical goals for the shift include no falls    Over the shift, the patient did make progress toward the goals.

## 2023-12-19 NOTE — NURSING NOTE
AM meds given.  Surgery rounding and at bedside.  Miralax given for c/o constipation.  Discharge order in place, plan to go to SNF after lunch per patient request and hopes to have a BM prior to leaving.

## 2023-12-19 NOTE — DISCHARGE SUMMARY
Discharge Diagnosis  Hematoma of left chest wall, initial encounter    Issues Requiring Follow-Up  Fall precaution  Hold anticoagulant  Follow-up with PCP and cardiology    Discharge Meds     Your medication list        CHANGE how you take these medications        Instructions Last Dose Given Next Dose Due   predniSONE 10 mg tablet  Commonly known as: Deltasone  What changed:   medication strength  how much to take      Take 1 tablet (10 mg) by mouth once daily.              CONTINUE taking these medications        Instructions Last Dose Given Next Dose Due   acetaminophen 325 mg tablet  Commonly known as: Tylenol           colestipol 1 gram tablet  Commonly known as: Colestid           gabapentin 100 mg capsule  Commonly known as: Neurontin      Take 1 capsule (100 mg) by mouth 3 times a day for 3 days.       latanoprost 0.005 % drops, emulsion           levothyroxine 100 mcg tablet  Commonly known as: Synthroid, Levoxyl           polyethylene glycol 17 gram packet  Commonly known as: Glycolax, Miralax      Take 17 g by mouth once daily.              STOP taking these medications      amLODIPine 2.5 mg tablet  Commonly known as: Norvasc        amoxicillin-pot clavulanate 500-125 mg tablet  Commonly known as: Augmentin        doxycycline 100 mg capsule  Commonly known as: Vibramycin        hydrALAZINE 10 mg tablet  Commonly known as: Apresoline        HYDROcodone-acetaminophen 5-325 mg tablet  Commonly known as: Norco        metoprolol succinate XL 25 mg 24 hr tablet  Commonly known as: Toprol-XL        saccharomyces boulardii 250 mg capsule  Commonly known as: Florastor                  Where to Get Your Medications        Information about where to get these medications is not yet available    Ask your nurse or doctor about these medications  predniSONE 10 mg tablet         Test Results Pending At Discharge  Pending Labs       No current pending labs.            Hospital Course   Ina Mcgowan is a 89 y.o.  female with a past medical history of atrial fibrillation, on Coumadin, hyperlipidemia, hypertension, hypothyroidism, and osteoarthritis who presented to the emergency department with a hematoma.  Patient's son states that he was called last evening and told that his mother had a bruise.  Patient is currently at [facility] on the skilled nursing side however she moved from independent to assisted living at [facility] when she finishes rehab.  Patient is alert and oriented. She does not remember falling or any sort of injury.   Patient was seen and evaluated by general surgery, no intervention was recommended.  H&H was followed closely, patient received FFP, vitamin K, and Coumadin was placed on hold.  Patient did receive 2 units of packed red blood cells.  H&H is now stable and up trending.  Cardiology has seen evaluated patient and recommending to hold Coumadin for now.  Patient is with stable vital signs,  Patient will need follow-up with cardiology.  Discharge to SNF.  Pertinent Physical Exam At Time of Discharge  Physical Exam  Vitals reviewed.   Constitutional:       Appearance: Normal appearance.   HENT:      Head: Normocephalic and atraumatic.      Mouth/Throat:      Mouth: Mucous membranes are moist.   Eyes:      Extraocular Movements: Extraocular movements intact.   Cardiovascular:      Rate and Rhythm: Rhythm irregular.   Pulmonary:      Effort: Pulmonary effort is normal.   Abdominal:      General: Bowel sounds are normal.   Musculoskeletal:         General: Normal range of motion.      Cervical back: Neck supple.   Skin:     General: Skin is warm and dry.      Comments: Left posterior torso with significant hematoma and bruising   Neurological:      Mental Status: She is alert and oriented to person, place, and time.         Outpatient Follow-Up  No future appointments.      Alexander Stephenson, APRN-CNP

## 2023-12-19 NOTE — NURSING NOTE
[facility] HERE TO  PATIENT.

## 2023-12-19 NOTE — NURSING NOTE
Patient resting quietly in bed.  Offers no complaints of pain/discomfort.  Awake and alert.  Respirations even and unlabored on RA.  Call light and commonly used items in reach.  Bed locked and in low position.  Uvaldo Carney updated on plan of care.

## 2023-12-19 NOTE — NURSING NOTE
Patient resting quietly in bed.  Offers no complaints of pain/discomfort.  No active bleeding noted.   Call light and commonly used items in reach.  Plan to discharge to SNF this afternoon after lunch.

## 2023-12-19 NOTE — NURSING NOTE
Rounded on pt. Pt laying in bed, eyes closed, respirations even and unlabored. Pt appears to be sleeping and in no apparent pain or distress. Call bell and belongings are placed in reach. Telemetry leads connected and reading on monitor. Bed alarm set. Continuing to monitor.